# Patient Record
Sex: FEMALE | Race: ASIAN | NOT HISPANIC OR LATINO | URBAN - METROPOLITAN AREA
[De-identification: names, ages, dates, MRNs, and addresses within clinical notes are randomized per-mention and may not be internally consistent; named-entity substitution may affect disease eponyms.]

---

## 2023-07-27 ENCOUNTER — HOSPITAL ENCOUNTER (OUTPATIENT)
Dept: DATA CONVERSION | Facility: HOSPITAL | Age: 63
Discharge: HOME | End: 2023-07-27
Attending: EMERGENCY MEDICINE

## 2023-07-27 DIAGNOSIS — E86.0 DEHYDRATION: ICD-10-CM

## 2023-07-27 DIAGNOSIS — R06.02 SHORTNESS OF BREATH: ICD-10-CM

## 2023-07-27 DIAGNOSIS — Z20.822 CONTACT WITH AND (SUSPECTED) EXPOSURE TO COVID-19: ICD-10-CM

## 2023-07-27 DIAGNOSIS — Z98.890 OTHER SPECIFIED POSTPROCEDURAL STATES: ICD-10-CM

## 2023-07-27 DIAGNOSIS — Z87.828 PERSONAL HISTORY OF OTHER (HEALED) PHYSICAL INJURY AND TRAUMA: ICD-10-CM

## 2023-07-27 DIAGNOSIS — R42 DIZZINESS AND GIDDINESS: ICD-10-CM

## 2023-07-27 DIAGNOSIS — R53.1 WEAKNESS: Primary | ICD-10-CM

## 2023-07-27 LAB
ALBUMIN SERPL-MCNC: 4.3 GM/DL (ref 3.5–5)
ALBUMIN/GLOB SERPL: 1.4 RATIO (ref 1.5–3)
ALP BLD-CCNC: 113 U/L (ref 35–125)
ALT SERPL-CCNC: 35 U/L (ref 5–40)
ANION GAP SERPL CALCULATED.3IONS-SCNC: 12 MMOL/L (ref 0–19)
AST SERPL-CCNC: 18 U/L (ref 5–40)
BACTERIA UR QL AUTO: NEGATIVE
BASOPHILS # BLD AUTO: 0.05 K/UL (ref 0–0.22)
BASOPHILS NFR BLD AUTO: 0.9 % (ref 0–1)
BILIRUB SERPL-MCNC: 0.3 MG/DL (ref 0.1–1.2)
BILIRUB UR QL STRIP.AUTO: NEGATIVE
BUN SERPL-MCNC: 11 MG/DL (ref 8–25)
BUN/CREAT SERPL: 18.3 RATIO (ref 8–21)
CALCIUM SERPL-MCNC: 9.1 MG/DL (ref 8.5–10.4)
CHLORIDE SERPL-SCNC: 100 MMOL/L (ref 97–107)
CLARITY UR: CLEAR
CO2 SERPL-SCNC: 25 MMOL/L (ref 24–31)
COLOR UR: COLORLESS
CREAT SERPL-MCNC: 0.6 MG/DL (ref 0.4–1.6)
D DIMER PPP FEU-MCNC: 0.5 MG/L FEU (ref 0.19–0.5)
DEPRECATED RDW RBC AUTO: 43.4 FL (ref 37–54)
DIFFERENTIAL METHOD BLD: NORMAL
EOSINOPHIL # BLD AUTO: 0.12 K/UL (ref 0–0.45)
EOSINOPHIL NFR BLD: 2.1 % (ref 0–3)
ERYTHROCYTE [DISTWIDTH] IN BLOOD BY AUTOMATED COUNT: 13.6 % (ref 11.7–15)
EUA DISCLAIMER: NORMAL
FLUAV RNA NPH QL NAA+PROBE: NEGATIVE
FLUBV RNA NPH QL NAA+PROBE: NEGATIVE
GFR SERPL CREATININE-BSD FRML MDRD: 100 ML/MIN/1.73 M2
GLOBULIN SER-MCNC: 3 G/DL (ref 1.9–3.7)
GLUCOSE SERPL-MCNC: 142 MG/DL (ref 65–99)
GLUCOSE UR STRIP.AUTO-MCNC: NEGATIVE MG/DL
HCT VFR BLD AUTO: 42.5 % (ref 36–44)
HGB BLD-MCNC: 13.8 GM/DL (ref 12–15)
HGB UR QL STRIP.AUTO: 3 /HPF (ref 0–3)
HGB UR QL: NEGATIVE
HS TROPONIN T DELTA: NORMAL (ref 0–4)
HYALINE CASTS UR QL AUTO: NORMAL /LPF
IMM GRANULOCYTES # BLD AUTO: 0.02 K/UL (ref 0–0.1)
KETONES UR QL STRIP.AUTO: NEGATIVE
LEUKOCYTE ESTERASE UR QL STRIP.AUTO: NEGATIVE
LYMPHOCYTES # BLD AUTO: 1.97 K/UL (ref 1.2–3.2)
LYMPHOCYTES NFR BLD MANUAL: 34.6 % (ref 20–40)
MCH RBC QN AUTO: 28.2 PG (ref 26–34)
MCHC RBC AUTO-ENTMCNC: 32.5 % (ref 31–37)
MCV RBC AUTO: 86.9 FL (ref 80–100)
MICROSCOPIC (UA): NORMAL
MONOCYTES # BLD AUTO: 0.37 K/UL (ref 0–0.8)
MONOCYTES NFR BLD MANUAL: 6.5 % (ref 0–8)
NEUTROPHILS # BLD AUTO: 3.17 K/UL
NEUTROPHILS # BLD AUTO: 3.17 K/UL (ref 1.8–7.7)
NEUTROPHILS.IMMATURE NFR BLD: 0.4 % (ref 0–1)
NEUTS SEG NFR BLD: 55.5 % (ref 50–70)
NITRITE UR QL STRIP.AUTO: NEGATIVE
NRBC BLD-RTO: 0 /100 WBC
NT-PROBNP SERPL-MCNC: 78 PG/ML (ref 0–226)
PH UR STRIP.AUTO: 7.5 [PH] (ref 4.6–8)
PLATELET # BLD AUTO: 343 K/UL (ref 150–450)
PMV BLD AUTO: 10.4 CU (ref 7–12.6)
POTASSIUM SERPL-SCNC: 3.6 MMOL/L (ref 3.4–5.1)
PROT SERPL-MCNC: 7.3 G/DL (ref 5.9–7.9)
PROT UR STRIP.AUTO-MCNC: NEGATIVE MG/DL
RBC # BLD AUTO: 4.89 M/UL (ref 4–4.9)
SARS-COV-2 RNA SPEC QL NAA+PROBE: NEGATIVE
SODIUM SERPL-SCNC: 137 MMOL/L (ref 133–145)
SP GR UR STRIP.AUTO: 1.01 (ref 1–1.03)
SQUAMOUS UR QL AUTO: NORMAL /HPF
TROPONIN T SERPL-MCNC: 6 NG/L
URINE CULTURE: NORMAL
UROBILINOGEN UR QL STRIP.AUTO: NORMAL MG/DL (ref 0–1)
WBC # BLD AUTO: 5.7 K/UL (ref 4.5–11)
WBC #/AREA URNS AUTO: NORMAL /HPF (ref 0–3)

## 2023-09-12 ENCOUNTER — HOSPITAL ENCOUNTER (OUTPATIENT)
Dept: DATA CONVERSION | Facility: HOSPITAL | Age: 63
End: 2023-09-12

## 2023-09-12 DIAGNOSIS — M54.16 RADICULOPATHY, LUMBAR REGION: ICD-10-CM

## 2023-09-27 DIAGNOSIS — M79.641 RIGHT HAND PAIN: ICD-10-CM

## 2023-09-27 DIAGNOSIS — M25.641 STIFFNESS OF RIGHT HAND JOINT: Primary | ICD-10-CM

## 2023-10-02 ENCOUNTER — TREATMENT (OUTPATIENT)
Dept: OCCUPATIONAL THERAPY | Facility: CLINIC | Age: 63
End: 2023-10-02
Payer: MEDICARE

## 2023-10-02 DIAGNOSIS — M25.641 STIFFNESS OF RIGHT HAND JOINT: ICD-10-CM

## 2023-10-02 DIAGNOSIS — M79.641 RIGHT HAND PAIN: ICD-10-CM

## 2023-10-02 PROBLEM — Z98.890: Status: ACTIVE | Noted: 2023-10-02

## 2023-10-02 PROCEDURE — 97530 THERAPEUTIC ACTIVITIES: CPT | Mod: GO | Performed by: OCCUPATIONAL THERAPIST

## 2023-10-02 PROCEDURE — 97110 THERAPEUTIC EXERCISES: CPT | Mod: GO | Performed by: OCCUPATIONAL THERAPIST

## 2023-10-02 PROCEDURE — 97140 MANUAL THERAPY 1/> REGIONS: CPT | Mod: GO | Performed by: OCCUPATIONAL THERAPIST

## 2023-10-02 NOTE — PROGRESS NOTES
Occupational Therapy    Occupational Therapy Treatment    Name: Emmy Olson  MRN: [id number]  : 1960  Date: 24    Time Calculation  Start Time: 1715  Stop Time: 1805  Time Calculation (min): 50 min  OT Therapeutic Procedures Time Entry  Manual Therapy Time Entry: 15  Therapeutic Activity Time Entry: 20  Therapeutic Exercise Time Entry: 15    Assessment:   Pt tolerated therapeutic exercises and activities with good attention and no report of pain, minimal verbal cues for positioning, doing well. Recommending 2-3x daily exercises and CMC joint and wrist/thumb massage. Pt verbalized good understanding.   Pt would benefit from continued skilled OT services with focus on right thumb flexion for improving functional pinch, and decreasing pain and edema, increasing functional use with upgrading activities, providing HEP, ther ex, modalities, therapeutic activities to meet her OT goals.     Plan: Reassessment of UEFI and /pinch strength at next visit.       Subjective   General: Pt report stiffness of thumb,  present and supportive with assistance with language translation.       Pain Assessment: 3-4/10       Objective  Provided coban wrap applied for increasing MP and IP flexion, pt instructed with care and use at home, provided information for indep application.        Therapy/Activity: Therapeutic Exercise  Therapeutic Exercise Activity 1: AROM wrist hand fingers (focus on thumb with warm up and passive assist / stretch)  Therapeutic Activity: Pt completed theraputty and hand gripper with  tasks and simulation of opening containers, cutting with knife, and wringing out wash cloth.        Hand Assessment    Right Hand AROM:  R Thumb MCP 0-60 (degrees): 40 Degrees  R Thumb IP 0-80 (degrees): 10 Degrees  R Thumb Opposition to Index: 3/10MKI  R Index  MCP 0-90 (degrees): 70 Degrees (1 cm DPC)  R Index PIP 0-100 (degrees): 95 Degrees  R Index DIP 0-70 (degrees): 50 Degrees      Right Hand  PROM:  Provided passive ROM and assist with HEP       Goals:  Active       OT Problem       PATIENT WILL DEMONSTRATE INDEPENDENCE IN HOME PROGRAM FOR SUPPORT OF PROGRESSION       Start:  10/02/23    Expected End:  10/13/23            PATIENT WILL REPORT PAIN OF 1/10 DEMONSTRATING A REDUCTION OF OVERALL PAIN       Start:  10/02/23    Expected End:  10/13/23

## 2023-10-06 ENCOUNTER — TREATMENT (OUTPATIENT)
Dept: OCCUPATIONAL THERAPY | Facility: CLINIC | Age: 63
End: 2023-10-06
Payer: MEDICARE

## 2023-10-06 DIAGNOSIS — M25.641 STIFFNESS OF RIGHT HAND JOINT: ICD-10-CM

## 2023-10-06 DIAGNOSIS — M79.641 RIGHT HAND PAIN: ICD-10-CM

## 2023-10-06 PROCEDURE — 97110 THERAPEUTIC EXERCISES: CPT | Mod: GO

## 2023-10-06 PROCEDURE — 97035 APP MDLTY 1+ULTRASOUND EA 15: CPT | Mod: GO

## 2023-10-06 PROCEDURE — 97022 WHIRLPOOL THERAPY: CPT | Mod: GO,CO

## 2023-10-06 NOTE — PROGRESS NOTES
"Occupational Therapy    Occupational Therapy Treatment    Name: Emmy Olson  MRN: [id number]  : 1960  Date: 10/06/23  Time Calculation  Start Time: 1015 (visit 4 of 4)  Stop Time: 1108  Time Calculation (min): 53 min       Subjective Pt present with  during entire session to translate and relay information.   Min edema throughout R hand.   General:  OT Last Visit  OT Received On: 10/06/23 (visit 4 of 4)     Pain Assessment:6/10 constant, taking ibufrofen 3-4 times a day.        Objective    stated , \" it's just sore and stiff\".     Modalities:  Modalities Used: Yes  Modality 1: Untimed Other: (comment) (Fluido therapy x 10 min to R hand and thumb)    Ultra sound performed x 5 min at 3MHZ, .8 power to base of R thumb. Good tolerance     Therapy/Activity:   Therapeutic Exercise  Therapeutic Exercise Activity 1: AROM wrist hand fingers (focus on thumb with warm up and passive assist / stretch)    Passive thumb flexion with use of coban and sharpie marker to promote fleixon of all joints x 5 min. Educated pt and  on technique, provided with supplies and educated to complete 3 times daily, 5 min each. Fair tolerance by pt.   Sensory:Hyper sensitive. Educated pt and  on sensory re-integration tech. Able to return demo.        OP EDUCATION:  Education  Individual(s) Educated: Patient, Spouse  Education Provided: Other (edema control, scar massage and coban wrapping)  Home Program: PROM, Wound/scar care  Patient/Caregiver Demonstrated Understanding: yes  Patient Response to Education: Patient/Caregiver Verbalized Understanding of Information, Patient/Caregiver Performed Return Demonstration of Exercises/Activities    Pain at end of session at 8/10. Did not want ice at this time. Educated in importance of use of ice and heat to decrease inflammation and increase movement.   PLAN: Pt to follow through with coban wrapping, PROM, edema control, scar massage. To explore use of finger flexion " glove at next visit.

## 2024-03-21 ENCOUNTER — HOSPITAL ENCOUNTER (OUTPATIENT)
Dept: RADIOLOGY | Facility: CLINIC | Age: 64
Discharge: HOME | End: 2024-03-21
Payer: MEDICARE

## 2024-03-21 DIAGNOSIS — M50.122 CERVICAL DISC DISORDER AT C5-C6 LEVEL WITH RADICULOPATHY: ICD-10-CM

## 2024-03-21 PROCEDURE — 72141 MRI NECK SPINE W/O DYE: CPT

## 2024-03-21 PROCEDURE — 72141 MRI NECK SPINE W/O DYE: CPT | Performed by: RADIOLOGY

## 2024-04-05 ENCOUNTER — EVALUATION (OUTPATIENT)
Dept: OCCUPATIONAL THERAPY | Facility: CLINIC | Age: 64
End: 2024-04-05
Payer: MEDICARE

## 2024-04-05 DIAGNOSIS — M17.12 UNILATERAL PRIMARY OSTEOARTHRITIS, LEFT KNEE: ICD-10-CM

## 2024-04-05 DIAGNOSIS — M18.30: Primary | ICD-10-CM

## 2024-04-05 PROBLEM — M19.049 CMC ARTHRITIS: Status: ACTIVE | Noted: 2024-04-05

## 2024-04-05 PROBLEM — M19.049 CMC ARTHRITIS: Status: RESOLVED | Noted: 2024-04-05 | Resolved: 2024-04-05

## 2024-04-05 PROCEDURE — 97110 THERAPEUTIC EXERCISES: CPT | Mod: GO | Performed by: OCCUPATIONAL THERAPIST

## 2024-04-05 PROCEDURE — 97165 OT EVAL LOW COMPLEX 30 MIN: CPT | Mod: GO | Performed by: OCCUPATIONAL THERAPIST

## 2024-04-05 ASSESSMENT — PAIN DESCRIPTION - DESCRIPTORS: DESCRIPTORS: NUMBNESS;PINS AND NEEDLES

## 2024-04-05 ASSESSMENT — PAIN - FUNCTIONAL ASSESSMENT: PAIN_FUNCTIONAL_ASSESSMENT: 0-10

## 2024-04-05 ASSESSMENT — PAIN SCALES - GENERAL: PAINLEVEL_OUTOF10: 8
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
## 2024-04-08 ENCOUNTER — APPOINTMENT (OUTPATIENT)
Dept: OCCUPATIONAL THERAPY | Facility: CLINIC | Age: 64
End: 2024-04-08
Payer: MEDICARE

## 2024-04-16 ENCOUNTER — TREATMENT (OUTPATIENT)
Dept: OCCUPATIONAL THERAPY | Facility: CLINIC | Age: 64
End: 2024-04-16
Payer: MEDICARE

## 2024-04-16 DIAGNOSIS — Z98.890 STATUS POST REPAIR OF NERVE: ICD-10-CM

## 2024-04-16 DIAGNOSIS — M18.30: Primary | ICD-10-CM

## 2024-04-16 DIAGNOSIS — M17.12 UNILATERAL PRIMARY OSTEOARTHRITIS, LEFT KNEE: ICD-10-CM

## 2024-04-16 PROCEDURE — 97530 THERAPEUTIC ACTIVITIES: CPT | Mod: GO | Performed by: OCCUPATIONAL THERAPIST

## 2024-04-16 PROCEDURE — 97140 MANUAL THERAPY 1/> REGIONS: CPT | Mod: GO | Performed by: OCCUPATIONAL THERAPIST

## 2024-04-16 PROCEDURE — 97110 THERAPEUTIC EXERCISES: CPT | Mod: GO | Performed by: OCCUPATIONAL THERAPIST

## 2024-04-16 ASSESSMENT — PAIN DESCRIPTION - DESCRIPTORS: DESCRIPTORS: ACHING

## 2024-04-16 ASSESSMENT — PAIN SCALES - GENERAL: PAINLEVEL_OUTOF10: 7

## 2024-04-16 ASSESSMENT — PAIN - FUNCTIONAL ASSESSMENT: PAIN_FUNCTIONAL_ASSESSMENT: 0-10
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
## 2024-04-23 ENCOUNTER — TREATMENT (OUTPATIENT)
Dept: OCCUPATIONAL THERAPY | Facility: CLINIC | Age: 64
End: 2024-04-23
Payer: MEDICARE

## 2024-04-23 DIAGNOSIS — M18.30: Primary | ICD-10-CM

## 2024-04-23 DIAGNOSIS — M17.12 UNILATERAL PRIMARY OSTEOARTHRITIS, LEFT KNEE: ICD-10-CM

## 2024-04-23 DIAGNOSIS — Z98.890 STATUS POST REPAIR OF NERVE: ICD-10-CM

## 2024-04-23 PROCEDURE — 97530 THERAPEUTIC ACTIVITIES: CPT | Mod: GO | Performed by: OCCUPATIONAL THERAPIST

## 2024-04-23 PROCEDURE — 97140 MANUAL THERAPY 1/> REGIONS: CPT | Mod: GO | Performed by: OCCUPATIONAL THERAPIST

## 2024-04-23 PROCEDURE — 97110 THERAPEUTIC EXERCISES: CPT | Mod: GO | Performed by: OCCUPATIONAL THERAPIST

## 2024-04-23 ASSESSMENT — PAIN SCALES - GENERAL: PAINLEVEL_OUTOF10: 5 - MODERATE PAIN

## 2024-04-23 ASSESSMENT — PAIN DESCRIPTION - DESCRIPTORS: DESCRIPTORS: ACHING

## 2024-04-23 ASSESSMENT — PAIN - FUNCTIONAL ASSESSMENT: PAIN_FUNCTIONAL_ASSESSMENT: 0-10
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
## 2024-04-30 ENCOUNTER — TREATMENT (OUTPATIENT)
Dept: OCCUPATIONAL THERAPY | Facility: CLINIC | Age: 64
End: 2024-04-30
Payer: MEDICARE

## 2024-04-30 DIAGNOSIS — M17.12 UNILATERAL PRIMARY OSTEOARTHRITIS, LEFT KNEE: ICD-10-CM

## 2024-04-30 DIAGNOSIS — M18.30: Primary | ICD-10-CM

## 2024-04-30 DIAGNOSIS — Z98.890 STATUS POST REPAIR OF NERVE: ICD-10-CM

## 2024-04-30 PROCEDURE — 97110 THERAPEUTIC EXERCISES: CPT | Mod: GO | Performed by: OCCUPATIONAL THERAPIST

## 2024-04-30 PROCEDURE — 97140 MANUAL THERAPY 1/> REGIONS: CPT | Mod: GO | Performed by: OCCUPATIONAL THERAPIST

## 2024-04-30 PROCEDURE — 97530 THERAPEUTIC ACTIVITIES: CPT | Mod: GO | Performed by: OCCUPATIONAL THERAPIST

## 2024-04-30 ASSESSMENT — PAIN SCALES - GENERAL: PAINLEVEL_OUTOF10: 4

## 2024-04-30 ASSESSMENT — PAIN - FUNCTIONAL ASSESSMENT: PAIN_FUNCTIONAL_ASSESSMENT: 0-10

## 2024-04-30 ASSESSMENT — PAIN DESCRIPTION - DESCRIPTORS: DESCRIPTORS: ACHING
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
## 2024-05-07 ENCOUNTER — OFFICE VISIT (OUTPATIENT)
Dept: PRIMARY CARE | Facility: CLINIC | Age: 64
End: 2024-05-07
Payer: MEDICARE

## 2024-05-07 VITALS
DIASTOLIC BLOOD PRESSURE: 80 MMHG | WEIGHT: 166 LBS | BODY MASS INDEX: 31.37 KG/M2 | HEART RATE: 83 BPM | OXYGEN SATURATION: 97 % | SYSTOLIC BLOOD PRESSURE: 110 MMHG | TEMPERATURE: 97.1 F

## 2024-05-07 DIAGNOSIS — Z00.00 PHYSICAL EXAM: Primary | ICD-10-CM

## 2024-05-07 DIAGNOSIS — E78.5 HYPERLIPIDEMIA, UNSPECIFIED HYPERLIPIDEMIA TYPE: ICD-10-CM

## 2024-05-07 DIAGNOSIS — Z12.31 BREAST CANCER SCREENING BY MAMMOGRAM: ICD-10-CM

## 2024-05-07 DIAGNOSIS — R10.84 GENERALIZED ABDOMINAL PAIN: ICD-10-CM

## 2024-05-07 DIAGNOSIS — E55.9 VITAMIN D DEFICIENCY: ICD-10-CM

## 2024-05-07 DIAGNOSIS — Z12.11 COLON CANCER SCREENING: ICD-10-CM

## 2024-05-07 DIAGNOSIS — N95.9 MENOPAUSAL AND POSTMENOPAUSAL DISORDER: ICD-10-CM

## 2024-05-07 DIAGNOSIS — Z76.0 MEDICATION REFILL: ICD-10-CM

## 2024-05-07 DIAGNOSIS — R07.89 CHEST DISCOMFORT: ICD-10-CM

## 2024-05-07 PROBLEM — M54.16 RADICULOPATHY OF LUMBAR REGION: Status: ACTIVE | Noted: 2023-08-29

## 2024-05-07 PROBLEM — R10.9 ABDOMINAL PAIN: Status: ACTIVE | Noted: 2023-06-19

## 2024-05-07 PROBLEM — N32.81 OAB (OVERACTIVE BLADDER): Status: ACTIVE | Noted: 2024-05-07

## 2024-05-07 PROBLEM — K59.00 CONSTIPATION: Status: ACTIVE | Noted: 2024-05-07

## 2024-05-07 PROBLEM — H25.13 AGE-RELATED NUCLEAR CATARACT OF BOTH EYES: Status: ACTIVE | Noted: 2024-05-07

## 2024-05-07 PROBLEM — M50.122 CERVICAL DISC DISORDER AT C5-C6 LEVEL WITH RADICULOPATHY: Status: ACTIVE | Noted: 2022-09-15

## 2024-05-07 PROBLEM — K21.9 GASTROESOPHAGEAL REFLUX DISEASE: Status: ACTIVE | Noted: 2024-05-07

## 2024-05-07 PROBLEM — K29.50 CHRONIC GASTRITIS: Status: ACTIVE | Noted: 2024-05-07

## 2024-05-07 PROCEDURE — 1123F ACP DISCUSS/DSCN MKR DOCD: CPT | Performed by: INTERNAL MEDICINE

## 2024-05-07 PROCEDURE — 99397 PER PM REEVAL EST PAT 65+ YR: CPT | Performed by: INTERNAL MEDICINE

## 2024-05-07 PROCEDURE — 90677 PCV20 VACCINE IM: CPT | Performed by: INTERNAL MEDICINE

## 2024-05-07 PROCEDURE — 1158F ADVNC CARE PLAN TLK DOCD: CPT | Performed by: INTERNAL MEDICINE

## 2024-05-07 PROCEDURE — 1036F TOBACCO NON-USER: CPT | Performed by: INTERNAL MEDICINE

## 2024-05-07 PROCEDURE — 99215 OFFICE O/P EST HI 40 MIN: CPT | Performed by: INTERNAL MEDICINE

## 2024-05-07 PROCEDURE — G0438 PPPS, INITIAL VISIT: HCPCS | Performed by: INTERNAL MEDICINE

## 2024-05-07 PROCEDURE — 1159F MED LIST DOCD IN RCRD: CPT | Performed by: INTERNAL MEDICINE

## 2024-05-07 RX ORDER — OMEPRAZOLE 20 MG/1
40 CAPSULE, DELAYED RELEASE ORAL
COMMUNITY
End: 2024-05-07 | Stop reason: SDUPTHER

## 2024-05-07 RX ORDER — GABAPENTIN 100 MG/1
100 CAPSULE ORAL
Qty: 90 CAPSULE | Refills: 3 | Status: SHIPPED | OUTPATIENT
Start: 2024-05-07 | End: 2025-05-07

## 2024-05-07 RX ORDER — OMEPRAZOLE 20 MG/1
40 CAPSULE, DELAYED RELEASE ORAL
Qty: 90 CAPSULE | Refills: 1 | Status: SHIPPED | OUTPATIENT
Start: 2024-05-07 | End: 2024-05-13 | Stop reason: ALTCHOICE

## 2024-05-07 RX ORDER — DONEPEZIL HYDROCHLORIDE 5 MG/1
5 TABLET, FILM COATED ORAL
Qty: 90 TABLET | Refills: 3 | Status: SHIPPED | OUTPATIENT
Start: 2024-05-07 | End: 2025-05-07

## 2024-05-07 RX ORDER — GABAPENTIN 100 MG/1
100 CAPSULE ORAL
COMMUNITY
Start: 2024-03-19 | End: 2024-05-07 | Stop reason: SDUPTHER

## 2024-05-07 RX ORDER — DONEPEZIL HYDROCHLORIDE 5 MG/1
5 TABLET, FILM COATED ORAL
COMMUNITY
Start: 2022-06-15 | End: 2024-05-07 | Stop reason: SDUPTHER

## 2024-05-07 RX ORDER — RIVASTIGMINE TARTRATE 1.5 MG/1
1.5 CAPSULE ORAL 2 TIMES DAILY
COMMUNITY
Start: 2024-04-16

## 2024-05-07 ASSESSMENT — PATIENT HEALTH QUESTIONNAIRE - PHQ9
1. LITTLE INTEREST OR PLEASURE IN DOING THINGS: NOT AT ALL
2. FEELING DOWN, DEPRESSED OR HOPELESS: NOT AT ALL
SUM OF ALL RESPONSES TO PHQ9 QUESTIONS 1 AND 2: 0

## 2024-05-07 NOTE — PROGRESS NOTES
Outpatient Visit Note    Chief Complaint   Patient presents with    Annual Exam       HPI:  Emmy Olson is a 63 y.o. female here  for  a Medicare Wellness Visit.  Pt has chronic abdominal pain. She has chronic GERD. She c/o abdominal pain that radiates midsternally and into her back. She cannot eat spicy food without having abdominal pain. No nausea, vomiting, fever, chills. + decreased appetite.  Pt also is c/o left chest pain. She would like a referral to [facility] to evaluate her heart health.     Health Maintenance    Denies smoking or illicit drug use, drinks no alcoholic beverages a week. Patient reports routine vision checks and dental cleanings.     Screening colonoscopy overdue. Screening mammogram due. DEXA due.    Hearing screen: reports no difficulty with hearing     Does the patient use opioid medications:  No    How does the patient rate their health status today: fair    Cognitive Screen:  AAAx3  to person, place and time: Yes     Impression: diagnosed with mild dementia by geriatric psych    Reviewed:   Past Medical History/Allergies:  Yes  Family History:  Yes  Social History:  Yes  Current Medications:  Yes  Vital Signs:  Yes  Advanced Directives:  discussed  Immunizations:  reviewed today  Home Safety:                    Up & Go test > 30 seconds?  No                   Home have rugs; lack grab bars in bathroom; lack handrail on stairs; have poor lighting?  No                   Hearing difficulties?  No  Geriatric Assessment                   ADL areas requiring assistance:  Does not need help with Dressing, Eating, Ambulating, Toileting, Grooming, Hygiene.                    IADL areas requiring assistance:  Does not need help with Shopping, Housework, Accounting, Transportation, Driving.   Medications: reviewed  Current supplements:  Reviewed and recorded.   Other providers: Reviewed and recorded          Past Medical History:   Diagnosis Date    Personal history of other specified  conditions     History of heartburn        Current Medications  Current Outpatient Medications   Medication Instructions    donepezil (ARICEPT) 5 mg, oral, Daily RT    gabapentin (NEURONTIN) 100 mg, oral, Daily RT    omeprazole (PRILOSEC) 40 mg, oral, Daily before breakfast    rivastigmine (EXELON) 1.5 mg, oral, 2 times daily        Allergies  No Known Allergies     Immunizations  Immunization History   Administered Date(s) Administered    Pneumococcal conjugate vaccine, 20-valent (PREVNAR 20) 05/07/2024        History reviewed. No pertinent surgical history.  No family history on file.  Social History     Tobacco Use    Smoking status: Never    Smokeless tobacco: Never   Substance Use Topics    Alcohol use: Never    Drug use: Never     Tobacco Use: Low Risk  (5/7/2024)    Patient History     Smoking Tobacco Use: Never     Smokeless Tobacco Use: Never     Passive Exposure: Not on file        ROS  All pertinent positive symptoms are included in the history of present illness.  All other systems have been reviewed and are negative and noncontributory to this patient's current ailments.    VITAL SIGNS  Vitals:    05/07/24 1415   BP: 110/80   Pulse: 83   Temp: 36.2 °C (97.1 °F)   SpO2: 97%     Vitals:    05/07/24 1415   Weight: 75.3 kg (166 lb)      Body mass index is 31.37 kg/m².     PHYSICAL EXAM  GENERAL APPEARANCE: well nourished, well developed, looks like stated age, in no acute distress, not ill or tired appearing, conversing well.   HEENT: no trauma, normocephalic. PERRLA and EOMI with normal external exam. TM's intact with no injection or effusion, no signs of infection. Nares patent, turbinates pink without discharge. Pharynx pink with no exudates or lesions, no enlarged tonsils.   NECK: no nodes, supple without rigidity, no neck mass was observed, no thyromegaly or thyroid nodules.   HEART: regular rate and rhythm, S1 and S2 heard with no murmurs or skipped beats, no carotid bruits.   LUNGS: clear to  auscultation bilaterally with no wheezes, crackles or rales.   ABDOMEN: no organomegaly, soft, nontender, nondistended, normal bowel sounds, no guarding/rebound/rigidity.   EXTREMITIES: moving all extremities equally with no edema or deformities.   SKIN: normal skin color and pigmentation, normal skin turgor without rash, lesions, or nodules visualized.   NEUROLOGIC EXAM: CN II-XII grossly intact, normal gait, normal balance, 5/5 muscle strength, sensation grossly intact.   PSYCH: mood and affect appropriate; alert and oriented to time, place, person; no difficulty with speech or language.   LYMPH NODES: no cervical lymphadenopathy.         Assessment/Plan   Diagnoses and all orders for this visit:  Physical exam  Chest discomfort  -     Referral to Cardiology; Future  Generalized abdominal pain  -     Referral to Gastroenterology; Future  Colon cancer screening  -     Referral to Gastroenterology; Future  Breast cancer screening by mammogram  -     BI mammo bilateral screening tomosynthesis; Future  Menopausal and postmenopausal disorder  -     XR DEXA bone density; Future  Hyperlipidemia, unspecified hyperlipidemia type  -     Lipid Panel; Future  -     Comprehensive Metabolic Panel; Future  -     CBC; Future  -     TSH with reflex to Free T4 if abnormal; Future  Vitamin D deficiency  -     Vitamin D 25-Hydroxy,Total (for eval of Vitamin D levels); Future  Medication refill  -     donepezil (Aricept) 5 mg tablet; Take 1 tablet (5 mg) by mouth once daily.  -     gabapentin (Neurontin) 100 mg capsule; Take 1 capsule (100 mg) by mouth once daily.  -     omeprazole (PriLOSEC) 20 mg DR capsule; Take 2 capsules (40 mg) by mouth once daily in the morning. Take before meals.  Other orders  -     Pneumococcal conjugate vaccine, 20-valent (PREVNAR 20)      This was a shared decision making visit.    Next Wellness Exam Due  In 1 year from today      Edgard Mishra MD   05/07/24   10:40 PM

## 2024-05-09 ENCOUNTER — LAB (OUTPATIENT)
Dept: LAB | Facility: LAB | Age: 64
End: 2024-05-09
Payer: MEDICARE

## 2024-05-09 DIAGNOSIS — E55.9 VITAMIN D DEFICIENCY: ICD-10-CM

## 2024-05-09 DIAGNOSIS — E78.5 HYPERLIPIDEMIA, UNSPECIFIED HYPERLIPIDEMIA TYPE: ICD-10-CM

## 2024-05-09 LAB
25(OH)D3 SERPL-MCNC: 17 NG/ML (ref 31–100)
ALBUMIN SERPL-MCNC: 4.2 G/DL (ref 3.5–5)
ALP BLD-CCNC: 65 U/L (ref 35–125)
ALT SERPL-CCNC: 12 U/L (ref 5–40)
ANION GAP SERPL CALC-SCNC: 10 MMOL/L
AST SERPL-CCNC: 15 U/L (ref 5–40)
BILIRUB SERPL-MCNC: 0.3 MG/DL (ref 0.1–1.2)
BUN SERPL-MCNC: 12 MG/DL (ref 8–25)
CALCIUM SERPL-MCNC: 9.2 MG/DL (ref 8.5–10.4)
CHLORIDE SERPL-SCNC: 104 MMOL/L (ref 97–107)
CHOLEST SERPL-MCNC: 149 MG/DL (ref 133–200)
CHOLEST/HDLC SERPL: 2.8 {RATIO}
CO2 SERPL-SCNC: 28 MMOL/L (ref 24–31)
CREAT SERPL-MCNC: 0.8 MG/DL (ref 0.4–1.6)
EGFRCR SERPLBLD CKD-EPI 2021: 82 ML/MIN/1.73M*2
ERYTHROCYTE [DISTWIDTH] IN BLOOD BY AUTOMATED COUNT: 13.7 % (ref 11.5–14.5)
GLUCOSE SERPL-MCNC: 96 MG/DL (ref 65–99)
HCT VFR BLD AUTO: 43.6 % (ref 36–46)
HDLC SERPL-MCNC: 54 MG/DL
HGB BLD-MCNC: 13.4 G/DL (ref 12–16)
LDLC SERPL CALC-MCNC: 83 MG/DL (ref 65–130)
MCH RBC QN AUTO: 27.7 PG (ref 26–34)
MCHC RBC AUTO-ENTMCNC: 30.7 G/DL (ref 32–36)
MCV RBC AUTO: 90 FL (ref 80–100)
NRBC BLD-RTO: 0 /100 WBCS (ref 0–0)
PLATELET # BLD AUTO: 216 X10*3/UL (ref 150–450)
POTASSIUM SERPL-SCNC: 4.4 MMOL/L (ref 3.4–5.1)
PROT SERPL-MCNC: 6.5 G/DL (ref 5.9–7.9)
RBC # BLD AUTO: 4.84 X10*6/UL (ref 4–5.2)
SODIUM SERPL-SCNC: 142 MMOL/L (ref 133–145)
TRIGL SERPL-MCNC: 60 MG/DL (ref 40–150)
TSH SERPL DL<=0.05 MIU/L-ACNC: 4.2 MIU/L (ref 0.27–4.2)
WBC # BLD AUTO: 4.7 X10*3/UL (ref 4.4–11.3)

## 2024-05-09 PROCEDURE — 84443 ASSAY THYROID STIM HORMONE: CPT

## 2024-05-09 PROCEDURE — 80061 LIPID PANEL: CPT

## 2024-05-09 PROCEDURE — 82306 VITAMIN D 25 HYDROXY: CPT

## 2024-05-09 PROCEDURE — 85027 COMPLETE CBC AUTOMATED: CPT

## 2024-05-09 PROCEDURE — 80053 COMPREHEN METABOLIC PANEL: CPT

## 2024-05-09 PROCEDURE — 36415 COLL VENOUS BLD VENIPUNCTURE: CPT

## 2024-05-10 RX ORDER — ASPIRIN 325 MG
50000 TABLET, DELAYED RELEASE (ENTERIC COATED) ORAL
Qty: 12 CAPSULE | Refills: 0 | Status: SHIPPED | OUTPATIENT
Start: 2024-05-12

## 2024-05-13 DIAGNOSIS — K21.9 GASTROESOPHAGEAL REFLUX DISEASE, UNSPECIFIED WHETHER ESOPHAGITIS PRESENT: ICD-10-CM

## 2024-05-13 RX ORDER — PANTOPRAZOLE SODIUM 40 MG/1
40 TABLET, DELAYED RELEASE ORAL
COMMUNITY
Start: 2022-09-13 | End: 2024-05-13 | Stop reason: SDUPTHER

## 2024-05-13 RX ORDER — PANTOPRAZOLE SODIUM 40 MG/1
40 TABLET, DELAYED RELEASE ORAL
Qty: 90 TABLET | Refills: 3 | Status: SHIPPED | OUTPATIENT
Start: 2024-05-13

## 2024-05-13 NOTE — TELEPHONE ENCOUNTER
Pt  called on the 5/7/2024 OV they came in for appt and the wrong medication for his wife's GERD was sent to the pharmacy. He said she does not take the omeprazole but the pantoprazole 40mg. Can this be sent to the pharmacy to be given to them. Did explain to come to next appt with their meds so that it can be checked to out charts.

## 2024-05-14 ENCOUNTER — APPOINTMENT (OUTPATIENT)
Dept: RADIOLOGY | Facility: HOSPITAL | Age: 64
End: 2024-05-14
Payer: MEDICARE

## 2024-05-14 ENCOUNTER — TREATMENT (OUTPATIENT)
Dept: OCCUPATIONAL THERAPY | Facility: CLINIC | Age: 64
End: 2024-05-14
Payer: MEDICARE

## 2024-05-14 DIAGNOSIS — M18.30: Primary | ICD-10-CM

## 2024-05-14 DIAGNOSIS — Z98.890 STATUS POST REPAIR OF NERVE: ICD-10-CM

## 2024-05-14 DIAGNOSIS — M17.12 UNILATERAL PRIMARY OSTEOARTHRITIS, LEFT KNEE: ICD-10-CM

## 2024-05-14 PROCEDURE — 97110 THERAPEUTIC EXERCISES: CPT | Mod: GO | Performed by: OCCUPATIONAL THERAPIST

## 2024-05-14 PROCEDURE — 97530 THERAPEUTIC ACTIVITIES: CPT | Mod: GO | Performed by: OCCUPATIONAL THERAPIST

## 2024-05-14 PROCEDURE — 97140 MANUAL THERAPY 1/> REGIONS: CPT | Mod: GO | Performed by: OCCUPATIONAL THERAPIST

## 2024-05-14 ASSESSMENT — PAIN - FUNCTIONAL ASSESSMENT: PAIN_FUNCTIONAL_ASSESSMENT: 0-10

## 2024-05-14 ASSESSMENT — PAIN DESCRIPTION - DESCRIPTORS: DESCRIPTORS: ACHING;NUMBNESS

## 2024-05-14 ASSESSMENT — PAIN SCALES - GENERAL: PAINLEVEL_OUTOF10: 2
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
## 2024-05-20 ENCOUNTER — OFFICE VISIT (OUTPATIENT)
Dept: CARDIOLOGY | Facility: CLINIC | Age: 64
End: 2024-05-20
Payer: MEDICARE

## 2024-05-20 VITALS — WEIGHT: 167 LBS | BODY MASS INDEX: 31.55 KG/M2

## 2024-05-20 DIAGNOSIS — E78.2 MIXED HYPERLIPIDEMIA: ICD-10-CM

## 2024-05-20 DIAGNOSIS — R07.89 OTHER CHEST PAIN: Primary | ICD-10-CM

## 2024-05-20 DIAGNOSIS — R07.89 CHEST DISCOMFORT: ICD-10-CM

## 2024-05-20 PROCEDURE — 93010 ELECTROCARDIOGRAM REPORT: CPT | Performed by: INTERNAL MEDICINE

## 2024-05-20 PROCEDURE — 1159F MED LIST DOCD IN RCRD: CPT | Performed by: INTERNAL MEDICINE

## 2024-05-20 PROCEDURE — 1125F AMNT PAIN NOTED PAIN PRSNT: CPT | Performed by: INTERNAL MEDICINE

## 2024-05-20 PROCEDURE — 99204 OFFICE O/P NEW MOD 45 MIN: CPT | Performed by: INTERNAL MEDICINE

## 2024-05-20 PROCEDURE — 1036F TOBACCO NON-USER: CPT | Performed by: INTERNAL MEDICINE

## 2024-05-20 PROCEDURE — 99214 OFFICE O/P EST MOD 30 MIN: CPT | Performed by: INTERNAL MEDICINE

## 2024-05-20 PROCEDURE — 93005 ELECTROCARDIOGRAM TRACING: CPT | Performed by: INTERNAL MEDICINE

## 2024-05-20 ASSESSMENT — PATIENT HEALTH QUESTIONNAIRE - PHQ9
1. LITTLE INTEREST OR PLEASURE IN DOING THINGS: NOT AT ALL
SUM OF ALL RESPONSES TO PHQ9 QUESTIONS 1 AND 2: 0
2. FEELING DOWN, DEPRESSED OR HOPELESS: NOT AT ALL

## 2024-05-20 ASSESSMENT — ENCOUNTER SYMPTOMS
LOSS OF SENSATION IN FEET: 0
DEPRESSION: 0
OCCASIONAL FEELINGS OF UNSTEADINESS: 1

## 2024-05-20 ASSESSMENT — PAIN SCALES - GENERAL: PAINLEVEL: 5

## 2024-05-20 NOTE — PROGRESS NOTES
Referred by Dr. Mishra for Establish Care (Intermittent left-sided chest pain, referred by Dr. Mishra)     History Of Present Illness:    Emmy Olson is a 63 y.o. female presenting with chest pain.  This patient is Georgian speaking and her  provides history and questioning.  The patient has had an epigastric discomfort for a number of years now which has been attributed to gastroesophageal reflux disease.  Periodically though the discomfort will radiate from the epigastric area up into the neck.  This does not occur all the time and is much less frequent.  Occurs relatively sporadically and resolved on its own.  But given the symptoms she was referred to our practice for cardiac evaluation.  He had no prior history of cardiac disease.      Past Medical History:  She has a past medical history of Personal history of other specified conditions.  Gastroesophageal reflux disease    Past Surgical History:  Cholecystectomy      Social History:  She reports that she has never smoked. She has never used smokeless tobacco. She reports that she does not drink alcohol and does not use drugs.    Family History:  Father  at approximately age 90 with no history of heart disease.  Mother is alive at age 90 with no history of heart disease.     Allergies:  Patient has no known allergies.    Outpatient Medications:  Current Outpatient Medications   Medication Instructions   • cholecalciferol (VITAMIN D-3) 50,000 Units, oral, Once Weekly   • donepezil (ARICEPT) 5 mg, oral, Daily RT   • gabapentin (NEURONTIN) 100 mg, oral, Daily RT   • pantoprazole (PROTONIX) 40 mg, oral, Daily before breakfast   • rivastigmine (EXELON) 1.5 mg, oral, 2 times daily        Last Recorded Vitals:  Vitals:    24 1300   Weight: 75.8 kg (167 lb)       Physical Exam:  Constitutional:       Appearance: Not in distress.   Eyes:      Conjunctiva/sclera: Conjunctivae normal.   HENT:    Mouth/Throat:      Pharynx: Oropharynx is clear.   Neck:       Vascular: No carotid bruit. JVD normal.   Pulmonary:      Breath sounds: Normal breath sounds. No wheezing. No rales.   Cardiovascular:      Regular rhythm.      Murmurs: There is no murmur.      No gallop.  No click. No rub.   Abdominal:      Palpations: Abdomen is soft.      Tenderness: There is no abdominal tenderness.   Musculoskeletal:         General: No deformity. Neurological:      General: No focal deficit present.           Last Labs:  CBC -  Lab Results   Component Value Date    WBC 4.7 05/09/2024    HGB 13.4 05/09/2024    HCT 43.6 05/09/2024    MCV 90 05/09/2024     05/09/2024       CMP -  Lab Results   Component Value Date    CALCIUM 9.2 05/09/2024    PROT 6.5 05/09/2024    ALBUMIN 4.2 05/09/2024    AST 15 05/09/2024    ALT 12 05/09/2024    ALKPHOS 65 05/09/2024    BILITOT 0.3 05/09/2024       LIPID PANEL -   Lab Results   Component Value Date    CHOL 149 05/09/2024    TRIG 60 05/09/2024    HDL 54.0 05/09/2024    CHHDL 2.8 05/09/2024       RENAL FUNCTION PANEL -   Lab Results   Component Value Date    GLUCOSE 96 05/09/2024     05/09/2024    K 4.4 05/09/2024     05/09/2024    CO2 28 05/09/2024    ANIONGAP 10 05/09/2024    BUN 12 05/09/2024    CREATININE 0.80 05/09/2024    CALCIUM 9.2 05/09/2024    ALBUMIN 4.2 05/09/2024        Lab Results   Component Value Date    HGBA1C 5.9 11/26/2019       Last Cardiology Tests:  ECG:  ECG 12 lead (Clinic Performed) 05/20/2024  Normal sinus rhythm  Anterior ST T wave abnormality consider ischemia.  Possible remote septal infarct.    Echo:  No results found for this or any previous visit from the past 1095 days.      Ejection Fractions:    Cath:  No results found for this or any previous visit from the past 1095 days      Stress Test:  No results found for this or any previous visit from the past 1095 days.      Cardiac Imaging:  No results found for this or any previous visit from the past 1095 days.            Assessment/Plan        Hyperlipidemia  Reviewed Dr. Mishra' lipid panel:  Tchol 149 TG 60 HDL 54 LDL 83.  These cholesterol levels are really are reasonable if she has a low overall risk.  Certainly if her stress test is abnormal and we may need to be more aggressive.    Other chest pain  The chest pain etiology remains unclear.  She has very few cardiovascular risk factors but her EKG is abnormal.  I will thus order a myocardial perfusion stress test to look for evidence of ischemia.  Her  does not think she could effectively exercise on a treadmill thus a pharmacologic stress test will be ordered.  I will bring the patient back to the office to review the results with her.     Shiva Carpio, DO

## 2024-05-20 NOTE — ASSESSMENT & PLAN NOTE
The chest pain etiology remains unclear.  She has very few cardiovascular risk factors but her EKG is abnormal.  I will thus order a myocardial perfusion stress test to look for evidence of ischemia.  Her  does not think she could effectively exercise on a treadmill thus a pharmacologic stress test will be ordered.  I will bring the patient back to the office to review the results with her.

## 2024-05-20 NOTE — ASSESSMENT & PLAN NOTE
Reviewed Dr. Mishra' lipid panel:  Tchol 149 TG 60 HDL 54 LDL 83.  These cholesterol levels are really are reasonable if she has a low overall risk.  Certainly if her stress test is abnormal and we may need to be more aggressive.

## 2024-05-21 ENCOUNTER — TREATMENT (OUTPATIENT)
Dept: OCCUPATIONAL THERAPY | Facility: CLINIC | Age: 64
End: 2024-05-21
Payer: MEDICARE

## 2024-05-21 DIAGNOSIS — M18.30: Primary | ICD-10-CM

## 2024-05-21 DIAGNOSIS — M17.12 UNILATERAL PRIMARY OSTEOARTHRITIS, LEFT KNEE: ICD-10-CM

## 2024-05-21 PROCEDURE — 97140 MANUAL THERAPY 1/> REGIONS: CPT | Mod: GO | Performed by: OCCUPATIONAL THERAPIST

## 2024-05-21 PROCEDURE — 97110 THERAPEUTIC EXERCISES: CPT | Mod: GO | Performed by: OCCUPATIONAL THERAPIST

## 2024-05-21 PROCEDURE — 97530 THERAPEUTIC ACTIVITIES: CPT | Mod: GO | Performed by: OCCUPATIONAL THERAPIST

## 2024-05-21 ASSESSMENT — PAIN DESCRIPTION - DESCRIPTORS: DESCRIPTORS: ACHING

## 2024-05-21 ASSESSMENT — PAIN SCALES - GENERAL: PAINLEVEL_OUTOF10: 4

## 2024-05-21 ASSESSMENT — PAIN - FUNCTIONAL ASSESSMENT: PAIN_FUNCTIONAL_ASSESSMENT: 0-10
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
## 2024-05-22 ENCOUNTER — HOSPITAL ENCOUNTER (OUTPATIENT)
Dept: RADIOLOGY | Facility: HOSPITAL | Age: 64
Discharge: HOME | End: 2024-05-22
Payer: MEDICARE

## 2024-05-22 VITALS — WEIGHT: 167 LBS | BODY MASS INDEX: 31.53 KG/M2 | HEIGHT: 61 IN

## 2024-05-22 DIAGNOSIS — Z12.31 BREAST CANCER SCREENING BY MAMMOGRAM: ICD-10-CM

## 2024-05-22 PROCEDURE — 77067 SCR MAMMO BI INCL CAD: CPT

## 2024-05-22 PROCEDURE — 77063 BREAST TOMOSYNTHESIS BI: CPT | Performed by: STUDENT IN AN ORGANIZED HEALTH CARE EDUCATION/TRAINING PROGRAM

## 2024-05-22 PROCEDURE — 77067 SCR MAMMO BI INCL CAD: CPT | Performed by: STUDENT IN AN ORGANIZED HEALTH CARE EDUCATION/TRAINING PROGRAM

## 2024-05-23 ENCOUNTER — APPOINTMENT (OUTPATIENT)
Dept: OCCUPATIONAL THERAPY | Facility: CLINIC | Age: 64
End: 2024-05-23
Payer: MEDICARE

## 2024-05-24 ENCOUNTER — HOSPITAL ENCOUNTER (OUTPATIENT)
Dept: RADIOLOGY | Facility: HOSPITAL | Age: 64
Discharge: HOME | End: 2024-05-24
Payer: MEDICARE

## 2024-05-24 DIAGNOSIS — N95.9 MENOPAUSAL AND POSTMENOPAUSAL DISORDER: ICD-10-CM

## 2024-05-24 PROCEDURE — 77080 DXA BONE DENSITY AXIAL: CPT | Performed by: RADIOLOGY

## 2024-05-24 PROCEDURE — 77080 DXA BONE DENSITY AXIAL: CPT

## 2024-05-28 ENCOUNTER — TELEPHONE (OUTPATIENT)
Dept: PRIMARY CARE | Facility: CLINIC | Age: 64
End: 2024-05-28
Payer: MEDICARE

## 2024-05-30 NOTE — TELEPHONE ENCOUNTER
Lm to call office   Doxepin Pregnancy And Lactation Text: This medication is Pregnancy Category C and it isn't known if it is safe during pregnancy. It is also excreted in breast milk and breast feeding isn't recommended. 06-Jan-2017 14-Jan-2017

## 2024-06-05 ENCOUNTER — TELEPHONE (OUTPATIENT)
Dept: PHYSICAL THERAPY | Facility: CLINIC | Age: 64
End: 2024-06-05
Payer: MEDICARE

## 2024-06-06 ENCOUNTER — HOSPITAL ENCOUNTER (OUTPATIENT)
Dept: RADIOLOGY | Facility: HOSPITAL | Age: 64
Discharge: HOME | End: 2024-06-06
Payer: MEDICARE

## 2024-06-06 ENCOUNTER — HOSPITAL ENCOUNTER (OUTPATIENT)
Dept: CARDIOLOGY | Facility: HOSPITAL | Age: 64
Discharge: HOME | End: 2024-06-06
Payer: MEDICARE

## 2024-06-06 DIAGNOSIS — R07.89 OTHER CHEST PAIN: ICD-10-CM

## 2024-06-06 PROCEDURE — 2500000004 HC RX 250 GENERAL PHARMACY W/ HCPCS (ALT 636 FOR OP/ED): Performed by: INTERNAL MEDICINE

## 2024-06-06 PROCEDURE — A9502 TC99M TETROFOSMIN: HCPCS | Performed by: INTERNAL MEDICINE

## 2024-06-06 PROCEDURE — 93018 CV STRESS TEST I&R ONLY: CPT | Performed by: INTERNAL MEDICINE

## 2024-06-06 PROCEDURE — 3430000001 HC RX 343 DIAGNOSTIC RADIOPHARMACEUTICALS: Performed by: INTERNAL MEDICINE

## 2024-06-06 PROCEDURE — 93016 CV STRESS TEST SUPVJ ONLY: CPT | Performed by: INTERNAL MEDICINE

## 2024-06-06 PROCEDURE — 78452 HT MUSCLE IMAGE SPECT MULT: CPT

## 2024-06-06 PROCEDURE — 78452 HT MUSCLE IMAGE SPECT MULT: CPT | Performed by: RADIOLOGY

## 2024-06-06 PROCEDURE — 93017 CV STRESS TEST TRACING ONLY: CPT

## 2024-06-06 RX ORDER — REGADENOSON 0.08 MG/ML
0.4 INJECTION, SOLUTION INTRAVENOUS ONCE
Status: COMPLETED | OUTPATIENT
Start: 2024-06-06 | End: 2024-06-06

## 2024-06-06 RX ADMIN — TETROFOSMIN 34.5 MILLICURIE: 0.23 INJECTION, POWDER, LYOPHILIZED, FOR SOLUTION INTRAVENOUS at 10:54

## 2024-06-06 RX ADMIN — TETROFOSMIN 10.5 MILLICURIE: 0.23 INJECTION, POWDER, LYOPHILIZED, FOR SOLUTION INTRAVENOUS at 09:45

## 2024-06-06 RX ADMIN — REGADENOSON 0.4 MG: 0.08 INJECTION, SOLUTION INTRAVENOUS at 10:52

## 2024-06-10 ENCOUNTER — TREATMENT (OUTPATIENT)
Dept: OCCUPATIONAL THERAPY | Facility: CLINIC | Age: 64
End: 2024-06-10
Payer: MEDICARE

## 2024-06-10 DIAGNOSIS — M17.12 UNILATERAL PRIMARY OSTEOARTHRITIS, LEFT KNEE: ICD-10-CM

## 2024-06-10 PROCEDURE — 97140 MANUAL THERAPY 1/> REGIONS: CPT | Mod: GO | Performed by: OCCUPATIONAL THERAPIST

## 2024-06-10 PROCEDURE — 97530 THERAPEUTIC ACTIVITIES: CPT | Mod: GO | Performed by: OCCUPATIONAL THERAPIST

## 2024-06-10 PROCEDURE — 97110 THERAPEUTIC EXERCISES: CPT | Mod: GO | Performed by: OCCUPATIONAL THERAPIST

## 2024-06-10 ASSESSMENT — PAIN SCALES - GENERAL: PAINLEVEL_OUTOF10: 3

## 2024-06-10 ASSESSMENT — PAIN - FUNCTIONAL ASSESSMENT: PAIN_FUNCTIONAL_ASSESSMENT: 0-10

## 2024-06-10 ASSESSMENT — PAIN DESCRIPTION - DESCRIPTORS: DESCRIPTORS: NUMBNESS;SHARP
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
## 2024-06-16 NOTE — ASSESSMENT & PLAN NOTE
LDL chol 83 on last check with Dr. Mishra.  Given negative stress test and few risk factors, believe that would be reasonable number.

## 2024-06-17 ENCOUNTER — TREATMENT (OUTPATIENT)
Dept: OCCUPATIONAL THERAPY | Facility: CLINIC | Age: 64
End: 2024-06-17
Payer: MEDICARE

## 2024-06-17 DIAGNOSIS — M17.12 UNILATERAL PRIMARY OSTEOARTHRITIS, LEFT KNEE: ICD-10-CM

## 2024-06-17 PROCEDURE — 97140 MANUAL THERAPY 1/> REGIONS: CPT | Mod: GO | Performed by: OCCUPATIONAL THERAPIST

## 2024-06-17 PROCEDURE — 97110 THERAPEUTIC EXERCISES: CPT | Mod: GO | Performed by: OCCUPATIONAL THERAPIST

## 2024-06-17 PROCEDURE — 97530 THERAPEUTIC ACTIVITIES: CPT | Mod: GO | Performed by: OCCUPATIONAL THERAPIST

## 2024-06-17 ASSESSMENT — PAIN - FUNCTIONAL ASSESSMENT: PAIN_FUNCTIONAL_ASSESSMENT: 0-10

## 2024-06-17 ASSESSMENT — PAIN DESCRIPTION - DESCRIPTORS: DESCRIPTORS: SHARP

## 2024-06-17 ASSESSMENT — PAIN SCALES - GENERAL: PAINLEVEL_OUTOF10: 3
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
## 2024-06-24 ENCOUNTER — TREATMENT (OUTPATIENT)
Dept: OCCUPATIONAL THERAPY | Facility: CLINIC | Age: 64
End: 2024-06-24
Payer: MEDICARE

## 2024-06-24 DIAGNOSIS — M18.30: Primary | ICD-10-CM

## 2024-06-24 DIAGNOSIS — M17.12 UNILATERAL PRIMARY OSTEOARTHRITIS, LEFT KNEE: ICD-10-CM

## 2024-06-24 PROCEDURE — 97110 THERAPEUTIC EXERCISES: CPT | Mod: GO | Performed by: OCCUPATIONAL THERAPIST

## 2024-06-24 PROCEDURE — 97140 MANUAL THERAPY 1/> REGIONS: CPT | Mod: GO | Performed by: OCCUPATIONAL THERAPIST

## 2024-06-24 PROCEDURE — 97530 THERAPEUTIC ACTIVITIES: CPT | Mod: GO | Performed by: OCCUPATIONAL THERAPIST

## 2024-06-24 ASSESSMENT — PAIN DESCRIPTION - DESCRIPTORS: DESCRIPTORS: SHARP;ACHING

## 2024-06-24 ASSESSMENT — PAIN - FUNCTIONAL ASSESSMENT: PAIN_FUNCTIONAL_ASSESSMENT: 0-10

## 2024-06-24 ASSESSMENT — PAIN SCALES - GENERAL: PAINLEVEL_OUTOF10: 2
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
## 2024-06-25 ENCOUNTER — OFFICE VISIT (OUTPATIENT)
Dept: CARDIOLOGY | Facility: CLINIC | Age: 64
End: 2024-06-25
Payer: MEDICARE

## 2024-06-25 VITALS
WEIGHT: 168 LBS | DIASTOLIC BLOOD PRESSURE: 80 MMHG | SYSTOLIC BLOOD PRESSURE: 122 MMHG | BODY MASS INDEX: 31.74 KG/M2 | HEART RATE: 76 BPM

## 2024-06-25 DIAGNOSIS — E78.2 MIXED HYPERLIPIDEMIA: ICD-10-CM

## 2024-06-25 DIAGNOSIS — R07.89 OTHER CHEST PAIN: Primary | ICD-10-CM

## 2024-06-25 PROCEDURE — 99212 OFFICE O/P EST SF 10 MIN: CPT | Performed by: INTERNAL MEDICINE

## 2024-06-25 PROCEDURE — 1159F MED LIST DOCD IN RCRD: CPT | Performed by: INTERNAL MEDICINE

## 2024-06-25 PROCEDURE — 1126F AMNT PAIN NOTED NONE PRSNT: CPT | Performed by: INTERNAL MEDICINE

## 2024-06-25 PROCEDURE — 1036F TOBACCO NON-USER: CPT | Performed by: INTERNAL MEDICINE

## 2024-06-25 ASSESSMENT — PATIENT HEALTH QUESTIONNAIRE - PHQ9
SUM OF ALL RESPONSES TO PHQ9 QUESTIONS 1 AND 2: 0
2. FEELING DOWN, DEPRESSED OR HOPELESS: NOT AT ALL
1. LITTLE INTEREST OR PLEASURE IN DOING THINGS: NOT AT ALL

## 2024-06-25 ASSESSMENT — ENCOUNTER SYMPTOMS
NUMBNESS: 0
PALPITATIONS: 0
COUGH: 0
DYSURIA: 0
SHORTNESS OF BREATH: 0
LOSS OF SENSATION IN FEET: 0
DYSPNEA ON EXERTION: 0
BLURRED VISION: 0
OCCASIONAL FEELINGS OF UNSTEADINESS: 0
DEPRESSION: 0
HEMATURIA: 0
ABDOMINAL PAIN: 0
PARESTHESIAS: 0

## 2024-06-25 ASSESSMENT — PAIN SCALES - GENERAL: PAINLEVEL: 0-NO PAIN

## 2024-06-25 NOTE — PROGRESS NOTES
Mart Olson is a 63 y.o. female.    Chief Complaint:  Follow-up (Stress test results)    HPI  Patient is doing well overall.  No further chest pain or discomfort.  Staying physically active without significant restriction.    Review of Systems   Constitutional: Negative for malaise/fatigue.   HENT:  Negative for congestion.    Eyes:  Negative for blurred vision.   Cardiovascular:  Negative for chest pain, dyspnea on exertion and palpitations.   Respiratory:  Negative for cough and shortness of breath.    Musculoskeletal:  Negative for joint pain.   Gastrointestinal:  Negative for abdominal pain.   Genitourinary:  Negative for dysuria and hematuria.   Neurological:  Negative for numbness and paresthesias.       Objective   Constitutional:       Appearance: Not in distress.   Eyes:      Conjunctiva/sclera: Conjunctivae normal.   Neck:      Vascular: JVD normal.   Pulmonary:      Breath sounds: Normal breath sounds. No wheezing. No rhonchi. No rales.   Cardiovascular:      Normal rate. Regular rhythm.      Murmurs: There is no murmur.      No gallop.  No click. No rub.   Abdominal:      Palpations: Abdomen is soft.   Neurological:      General: No focal deficit present.      Mental Status: Alert.         Lab Review:       Assessment/Plan   The primary encounter diagnosis was Other chest pain. A diagnosis of Mixed hyperlipidemia was also pertinent to this visit.    Hyperlipidemia  LDL chol 83 on last check with Dr. Mishra.  Given negative stress test and few risk factors, believe that would be reasonable number.    Other chest pain  Reviewed negative stress test and provided some reassurance.  Chest discomfort the patient had been feeling likely noncardiac.  Will just recommend standard risk factor modification and follow on a clinical basis.  Will have the patient follow-up with myself on an as-needed basis.

## 2024-06-25 NOTE — ASSESSMENT & PLAN NOTE
Reviewed negative stress test and provided some reassurance.  Chest discomfort the patient had been feeling likely noncardiac.  Will just recommend standard risk factor modification and follow on a clinical basis.  Will have the patient follow-up with myself on an as-needed basis.

## 2024-07-02 ENCOUNTER — TREATMENT (OUTPATIENT)
Dept: OCCUPATIONAL THERAPY | Facility: CLINIC | Age: 64
End: 2024-07-02
Payer: MEDICARE

## 2024-07-02 DIAGNOSIS — M18.30: Primary | ICD-10-CM

## 2024-07-02 DIAGNOSIS — M17.12 UNILATERAL PRIMARY OSTEOARTHRITIS, LEFT KNEE: ICD-10-CM

## 2024-07-02 PROCEDURE — 97140 MANUAL THERAPY 1/> REGIONS: CPT | Mod: GO | Performed by: OCCUPATIONAL THERAPIST

## 2024-07-02 PROCEDURE — 97530 THERAPEUTIC ACTIVITIES: CPT | Mod: GO | Performed by: OCCUPATIONAL THERAPIST

## 2024-07-02 PROCEDURE — 97110 THERAPEUTIC EXERCISES: CPT | Mod: GO | Performed by: OCCUPATIONAL THERAPIST

## 2024-07-02 ASSESSMENT — PAIN SCALES - GENERAL: PAINLEVEL_OUTOF10: 3

## 2024-07-02 ASSESSMENT — PAIN - FUNCTIONAL ASSESSMENT: PAIN_FUNCTIONAL_ASSESSMENT: 0-10

## 2024-07-02 ASSESSMENT — PAIN DESCRIPTION - DESCRIPTORS: DESCRIPTORS: ACHING
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
## 2024-07-09 ENCOUNTER — TELEPHONE (OUTPATIENT)
Dept: PRIMARY CARE | Facility: CLINIC | Age: 64
End: 2024-07-09

## 2024-07-09 ENCOUNTER — OFFICE VISIT (OUTPATIENT)
Dept: PRIMARY CARE | Facility: CLINIC | Age: 64
End: 2024-07-09
Payer: MEDICARE

## 2024-07-09 VITALS
WEIGHT: 169 LBS | SYSTOLIC BLOOD PRESSURE: 130 MMHG | OXYGEN SATURATION: 98 % | DIASTOLIC BLOOD PRESSURE: 80 MMHG | HEART RATE: 77 BPM | BODY MASS INDEX: 31.93 KG/M2 | TEMPERATURE: 97.4 F

## 2024-07-09 DIAGNOSIS — M81.0 AGE RELATED OSTEOPOROSIS, UNSPECIFIED PATHOLOGICAL FRACTURE PRESENCE: Primary | ICD-10-CM

## 2024-07-09 DIAGNOSIS — L56.8 PHOTOSENSITIVITY OF SKIN: ICD-10-CM

## 2024-07-09 DIAGNOSIS — F03.A0 MILD DEMENTIA WITHOUT BEHAVIORAL DISTURBANCE, PSYCHOTIC DISTURBANCE, MOOD DISTURBANCE, OR ANXIETY, UNSPECIFIED DEMENTIA TYPE (MULTI): ICD-10-CM

## 2024-07-09 PROCEDURE — 1158F ADVNC CARE PLAN TLK DOCD: CPT | Performed by: INTERNAL MEDICINE

## 2024-07-09 PROCEDURE — 1159F MED LIST DOCD IN RCRD: CPT | Performed by: INTERNAL MEDICINE

## 2024-07-09 PROCEDURE — 1126F AMNT PAIN NOTED NONE PRSNT: CPT | Performed by: INTERNAL MEDICINE

## 2024-07-09 PROCEDURE — 99213 OFFICE O/P EST LOW 20 MIN: CPT | Performed by: INTERNAL MEDICINE

## 2024-07-09 PROCEDURE — 1123F ACP DISCUSS/DSCN MKR DOCD: CPT | Performed by: INTERNAL MEDICINE

## 2024-07-09 RX ORDER — PNV NO.95/FERROUS FUM/FOLIC AC 28MG-0.8MG
1 TABLET ORAL DAILY
Qty: 90 TABLET | Refills: 3 | Status: SHIPPED | OUTPATIENT
Start: 2024-07-09 | End: 2025-07-09

## 2024-07-09 RX ORDER — DENOSUMAB 60 MG/ML
60 INJECTION SUBCUTANEOUS
Qty: 1 ML | Refills: 1 | Status: SHIPPED | OUTPATIENT
Start: 2024-07-09

## 2024-07-09 ASSESSMENT — PAIN SCALES - GENERAL: PAINLEVEL: 0-NO PAIN

## 2024-07-09 NOTE — PROGRESS NOTES
Subjective   Patient ID: Emmy Olson is a 63 y.o. female who presents for BONE DENSITY RESULTS.    This is a 63 y.o. presenting for follow up of bone density results. Pt was noted to have osteoporosis. Because she has chronic abdominal symptoms, oral medications are not recommended.  Pt was also diagnosed with mild dementia by Dr. Purvis, but  states that she is not taking medications consistently because they are sedating.  She is also c/o rash and itching when her skin is exposed to hot water, or when the weather is hot outside.         Review of Systems   All other systems reviewed and are negative.      Objective   /80   Pulse 77   Temp 36.3 °C (97.4 °F)   Wt 76.7 kg (169 lb)   SpO2 98%   BMI 31.93 kg/m²     Physical Exam  Vitals reviewed.   Constitutional:       General: She is not in acute distress.     Appearance: Normal appearance. She is not ill-appearing.   Cardiovascular:      Rate and Rhythm: Normal rate and regular rhythm.      Heart sounds: Normal heart sounds.   Pulmonary:      Effort: Pulmonary effort is normal.      Breath sounds: Normal breath sounds.   Musculoskeletal:         General: No swelling or tenderness.      Right lower leg: No edema.      Left lower leg: No edema.   Skin:     General: Skin is warm and dry.   Neurological:      General: No focal deficit present.      Mental Status: She is alert and oriented to person, place, and time.   Psychiatric:         Mood and Affect: Mood normal.         Assessment/Plan   Diagnoses and all orders for this visit:  Age related osteoporosis, unspecified pathological fracture presence  -     calcium carbonate-vitamin D3 (Oscal-500) 500 mg-10 mcg (400 unit) tablet; Take 1 tablet by mouth once daily.  -     denosumab (Prolia) 60 mg/mL syringe; Inject 1 mL (60 mg total) under the skin every 6 months.  Mild dementia without behavioral disturbance, psychotic disturbance, mood disturbance, or anxiety, unspecified dementia type  (Multi)  Photosensitivity of skin  Comments:  Supportive care

## 2024-07-09 NOTE — TELEPHONE ENCOUNTER
Approved  Prior Authorization Portal   Prior authorization approved  Payer: [facility] and [employer] - Medicare Case ID: [id number]  Note from payer: [place] Case: [id number], Status: Approved, Coverage Starts on: 4/9/2024 12:00:00 AM, Coverage Ends on: 7/9/2025 12:00:00 AM.  Approval Details

## 2024-07-10 PROBLEM — F03.A0 MILD DEMENTIA WITHOUT BEHAVIORAL DISTURBANCE, PSYCHOTIC DISTURBANCE, MOOD DISTURBANCE, OR ANXIETY, UNSPECIFIED DEMENTIA TYPE (MULTI): Status: ACTIVE | Noted: 2024-07-10

## 2024-07-11 ENCOUNTER — TREATMENT (OUTPATIENT)
Dept: OCCUPATIONAL THERAPY | Facility: CLINIC | Age: 64
End: 2024-07-11
Payer: MEDICARE

## 2024-07-11 DIAGNOSIS — M18.30: Primary | ICD-10-CM

## 2024-07-11 DIAGNOSIS — M17.12 UNILATERAL PRIMARY OSTEOARTHRITIS, LEFT KNEE: ICD-10-CM

## 2024-07-11 PROCEDURE — 97530 THERAPEUTIC ACTIVITIES: CPT | Mod: GO | Performed by: OCCUPATIONAL THERAPIST

## 2024-07-11 PROCEDURE — 97140 MANUAL THERAPY 1/> REGIONS: CPT | Mod: GO | Performed by: OCCUPATIONAL THERAPIST

## 2024-07-11 PROCEDURE — 97110 THERAPEUTIC EXERCISES: CPT | Mod: GO | Performed by: OCCUPATIONAL THERAPIST
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
## 2024-07-12 ASSESSMENT — PAIN DESCRIPTION - DESCRIPTORS: DESCRIPTORS: DULL;DISCOMFORT

## 2024-07-12 ASSESSMENT — PAIN - FUNCTIONAL ASSESSMENT: PAIN_FUNCTIONAL_ASSESSMENT: 0-10

## 2024-07-12 ASSESSMENT — PAIN SCALES - GENERAL: PAINLEVEL_OUTOF10: 3

## 2024-07-18 ENCOUNTER — TELEPHONE (OUTPATIENT)
Dept: PRIMARY CARE | Facility: CLINIC | Age: 64
End: 2024-07-18
Payer: MEDICARE

## 2024-07-18 DIAGNOSIS — M81.0 AGE RELATED OSTEOPOROSIS, UNSPECIFIED PATHOLOGICAL FRACTURE PRESENCE: ICD-10-CM

## 2024-07-18 NOTE — TELEPHONE ENCOUNTER
Pt's  is calling regarding  injections, I was not clear on what he said about them, I understood him to say he needed to speak with the nurse regarding them, please call pt's

## 2024-07-19 ENCOUNTER — CLINICAL SUPPORT (OUTPATIENT)
Dept: PRIMARY CARE | Facility: CLINIC | Age: 64
End: 2024-07-19
Payer: MEDICARE

## 2024-07-19 PROCEDURE — 2500000004 HC RX 250 GENERAL PHARMACY W/ HCPCS (ALT 636 FOR OP/ED): Mod: JZ | Performed by: STUDENT IN AN ORGANIZED HEALTH CARE EDUCATION/TRAINING PROGRAM

## 2024-07-19 PROCEDURE — 96372 THER/PROPH/DIAG INJ SC/IM: CPT | Performed by: STUDENT IN AN ORGANIZED HEALTH CARE EDUCATION/TRAINING PROGRAM

## 2024-07-26 ENCOUNTER — APPOINTMENT (OUTPATIENT)
Dept: OCCUPATIONAL THERAPY | Facility: CLINIC | Age: 64
End: 2024-07-26
Payer: MEDICARE

## 2024-08-02 ENCOUNTER — TREATMENT (OUTPATIENT)
Dept: OCCUPATIONAL THERAPY | Facility: CLINIC | Age: 64
End: 2024-08-02
Payer: MEDICARE

## 2024-08-02 DIAGNOSIS — M18.30: ICD-10-CM

## 2024-08-02 DIAGNOSIS — M17.12 UNILATERAL PRIMARY OSTEOARTHRITIS, LEFT KNEE: Primary | ICD-10-CM

## 2024-08-02 PROCEDURE — 97110 THERAPEUTIC EXERCISES: CPT | Mod: GO | Performed by: OCCUPATIONAL THERAPIST

## 2024-08-02 PROCEDURE — 97140 MANUAL THERAPY 1/> REGIONS: CPT | Mod: GO | Performed by: OCCUPATIONAL THERAPIST
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
## 2024-08-05 ENCOUNTER — TREATMENT (OUTPATIENT)
Dept: OCCUPATIONAL THERAPY | Facility: CLINIC | Age: 64
End: 2024-08-05
Payer: MEDICARE

## 2024-08-05 DIAGNOSIS — M17.12 UNILATERAL PRIMARY OSTEOARTHRITIS, LEFT KNEE: ICD-10-CM

## 2024-08-05 PROCEDURE — 97110 THERAPEUTIC EXERCISES: CPT | Mod: GO | Performed by: OCCUPATIONAL THERAPIST

## 2024-08-05 PROCEDURE — 97140 MANUAL THERAPY 1/> REGIONS: CPT | Mod: GO | Performed by: OCCUPATIONAL THERAPIST

## 2024-08-05 PROCEDURE — 97530 THERAPEUTIC ACTIVITIES: CPT | Mod: GO | Performed by: OCCUPATIONAL THERAPIST

## 2024-08-05 ASSESSMENT — PAIN DESCRIPTION - DESCRIPTORS: DESCRIPTORS: DULL

## 2024-08-05 ASSESSMENT — PAIN - FUNCTIONAL ASSESSMENT: PAIN_FUNCTIONAL_ASSESSMENT: 0-10

## 2024-08-05 ASSESSMENT — PAIN SCALES - GENERAL: PAINLEVEL_OUTOF10: 3
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
## 2024-08-23 ENCOUNTER — APPOINTMENT (OUTPATIENT)
Dept: OCCUPATIONAL THERAPY | Facility: CLINIC | Age: 64
End: 2024-08-23
Payer: MEDICARE

## 2024-08-30 ENCOUNTER — APPOINTMENT (OUTPATIENT)
Dept: OCCUPATIONAL THERAPY | Facility: CLINIC | Age: 64
End: 2024-08-30
Payer: MEDICARE

## 2024-09-03 ENCOUNTER — TREATMENT (OUTPATIENT)
Dept: OCCUPATIONAL THERAPY | Facility: CLINIC | Age: 64
End: 2024-09-03
Payer: MEDICARE

## 2024-09-03 DIAGNOSIS — M17.12 UNILATERAL PRIMARY OSTEOARTHRITIS, LEFT KNEE: Primary | ICD-10-CM

## 2024-09-03 DIAGNOSIS — M18.30: ICD-10-CM

## 2024-09-03 PROCEDURE — 97140 MANUAL THERAPY 1/> REGIONS: CPT | Mod: GO | Performed by: OCCUPATIONAL THERAPIST

## 2024-09-03 PROCEDURE — 97110 THERAPEUTIC EXERCISES: CPT | Mod: GO | Performed by: OCCUPATIONAL THERAPIST

## 2024-09-03 PROCEDURE — 97530 THERAPEUTIC ACTIVITIES: CPT | Mod: GO | Performed by: OCCUPATIONAL THERAPIST

## 2024-09-03 ASSESSMENT — PAIN - FUNCTIONAL ASSESSMENT: PAIN_FUNCTIONAL_ASSESSMENT: 0-10

## 2024-09-03 ASSESSMENT — PAIN DESCRIPTION - DESCRIPTORS: DESCRIPTORS: DISCOMFORT

## 2024-09-03 ASSESSMENT — PAIN SCALES - GENERAL: PAINLEVEL_OUTOF10: 4
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
## 2024-09-09 ENCOUNTER — DOCUMENTATION (OUTPATIENT)
Dept: OCCUPATIONAL THERAPY | Facility: CLINIC | Age: 64
End: 2024-09-09
Payer: MEDICARE

## 2024-09-09 ENCOUNTER — TREATMENT (OUTPATIENT)
Dept: OCCUPATIONAL THERAPY | Facility: CLINIC | Age: 64
End: 2024-09-09
Payer: MEDICARE

## 2024-09-09 DIAGNOSIS — M17.12 UNILATERAL PRIMARY OSTEOARTHRITIS, LEFT KNEE: ICD-10-CM

## 2024-09-09 NOTE — PROGRESS NOTES
Occupational Therapy                 Therapy Communication Note    Patient Name: Emmy Olson  MRN: [id number]  Today's Date: 9/9/2024     Discipline: Occupational Therapy    Missed Visit Reason:      Missed Time: Cancel    Comment: Pt cancelled due to modality unavailable, pt has limited treatment visits and will reschedule for next week.

## 2024-09-16 ENCOUNTER — APPOINTMENT (OUTPATIENT)
Dept: OCCUPATIONAL THERAPY | Facility: CLINIC | Age: 64
End: 2024-09-16
Payer: MEDICARE

## 2024-09-19 ENCOUNTER — APPOINTMENT (OUTPATIENT)
Dept: OCCUPATIONAL THERAPY | Facility: CLINIC | Age: 64
End: 2024-09-19
Payer: MEDICARE

## 2024-09-19 DIAGNOSIS — M17.12 UNILATERAL PRIMARY OSTEOARTHRITIS, LEFT KNEE: Primary | ICD-10-CM

## 2024-09-19 PROCEDURE — 97530 THERAPEUTIC ACTIVITIES: CPT | Mod: GO | Performed by: OCCUPATIONAL THERAPIST

## 2024-09-19 PROCEDURE — 97140 MANUAL THERAPY 1/> REGIONS: CPT | Mod: GO | Performed by: OCCUPATIONAL THERAPIST

## 2024-09-19 PROCEDURE — 97110 THERAPEUTIC EXERCISES: CPT | Mod: GO | Performed by: OCCUPATIONAL THERAPIST

## 2024-09-19 ASSESSMENT — PAIN - FUNCTIONAL ASSESSMENT: PAIN_FUNCTIONAL_ASSESSMENT: 0-10

## 2024-09-19 ASSESSMENT — PAIN SCALES - GENERAL: PAINLEVEL_OUTOF10: 4
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
## 2024-10-17 ENCOUNTER — TREATMENT (OUTPATIENT)
Dept: OCCUPATIONAL THERAPY | Facility: CLINIC | Age: 64
End: 2024-10-17
Payer: MEDICARE

## 2024-10-17 DIAGNOSIS — M17.12 UNILATERAL PRIMARY OSTEOARTHRITIS, LEFT KNEE: ICD-10-CM

## 2024-10-17 PROCEDURE — 97110 THERAPEUTIC EXERCISES: CPT | Mod: GO | Performed by: OCCUPATIONAL THERAPIST

## 2024-10-17 PROCEDURE — 97530 THERAPEUTIC ACTIVITIES: CPT | Mod: GO | Performed by: OCCUPATIONAL THERAPIST

## 2024-10-17 PROCEDURE — 97140 MANUAL THERAPY 1/> REGIONS: CPT | Mod: GO | Performed by: OCCUPATIONAL THERAPIST

## 2024-10-17 ASSESSMENT — PAIN SCALES - GENERAL: PAINLEVEL_OUTOF10: 4

## 2024-10-17 ASSESSMENT — PAIN DESCRIPTION - DESCRIPTORS: DESCRIPTORS: TIGHTNESS

## 2024-10-17 ASSESSMENT — PAIN - FUNCTIONAL ASSESSMENT: PAIN_FUNCTIONAL_ASSESSMENT: 0-10

## 2024-10-24 ENCOUNTER — TREATMENT (OUTPATIENT)
Dept: OCCUPATIONAL THERAPY | Facility: CLINIC | Age: 64
End: 2024-10-24
Payer: MEDICARE

## 2024-10-24 DIAGNOSIS — M18.30: ICD-10-CM

## 2024-10-24 DIAGNOSIS — M17.12 UNILATERAL PRIMARY OSTEOARTHRITIS, LEFT KNEE: Primary | ICD-10-CM

## 2024-10-24 PROCEDURE — 97168 OT RE-EVAL EST PLAN CARE: CPT | Mod: GO | Performed by: OCCUPATIONAL THERAPIST

## 2024-10-24 PROCEDURE — 97110 THERAPEUTIC EXERCISES: CPT | Mod: GO | Performed by: OCCUPATIONAL THERAPIST

## 2024-10-24 PROCEDURE — 97140 MANUAL THERAPY 1/> REGIONS: CPT | Mod: GO | Performed by: OCCUPATIONAL THERAPIST

## 2024-10-24 ASSESSMENT — PAIN DESCRIPTION - DESCRIPTORS: DESCRIPTORS: TIGHTNESS;SORE

## 2024-10-24 ASSESSMENT — PAIN SCALES - GENERAL: PAINLEVEL_OUTOF10: 3

## 2024-10-24 ASSESSMENT — PAIN - FUNCTIONAL ASSESSMENT: PAIN_FUNCTIONAL_ASSESSMENT: 0-10
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
## 2024-12-31 ENCOUNTER — HOSPITAL ENCOUNTER (OUTPATIENT)
Dept: RADIOLOGY | Facility: HOSPITAL | Age: 64
Discharge: HOME | End: 2024-12-31
Payer: MEDICARE

## 2024-12-31 DIAGNOSIS — M48.062 SPINAL STENOSIS, LUMBAR REGION WITH NEUROGENIC CLAUDICATION: ICD-10-CM

## 2024-12-31 PROCEDURE — 72148 MRI LUMBAR SPINE W/O DYE: CPT

## 2024-12-31 PROCEDURE — 72148 MRI LUMBAR SPINE W/O DYE: CPT | Performed by: RADIOLOGY

## 2025-01-10 ENCOUNTER — EVALUATION (OUTPATIENT)
Dept: OCCUPATIONAL THERAPY | Facility: CLINIC | Age: 65
End: 2025-01-10
Payer: MEDICARE

## 2025-01-10 DIAGNOSIS — M25.641 STIFFNESS OF RIGHT HAND, NOT ELSEWHERE CLASSIFIED: Primary | ICD-10-CM

## 2025-01-10 PROCEDURE — 97166 OT EVAL MOD COMPLEX 45 MIN: CPT | Mod: GO | Performed by: OCCUPATIONAL THERAPIST

## 2025-01-10 ASSESSMENT — PAIN DESCRIPTION - DESCRIPTORS: DESCRIPTORS: SORE

## 2025-01-10 ASSESSMENT — PAIN - FUNCTIONAL ASSESSMENT: PAIN_FUNCTIONAL_ASSESSMENT: 0-10

## 2025-01-10 ASSESSMENT — PAIN SCALES - GENERAL: PAINLEVEL_OUTOF10: 2
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
## 2025-01-13 ENCOUNTER — TELEPHONE (OUTPATIENT)
Dept: OCCUPATIONAL THERAPY | Facility: CLINIC | Age: 65
End: 2025-01-13
Payer: MEDICARE

## 2025-01-13 ENCOUNTER — TELEPHONE (OUTPATIENT)
Dept: PRIMARY CARE | Facility: CLINIC | Age: 65
End: 2025-01-13
Payer: MEDICARE

## 2025-01-14 DIAGNOSIS — M81.0 AGE RELATED OSTEOPOROSIS, UNSPECIFIED PATHOLOGICAL FRACTURE PRESENCE: ICD-10-CM

## 2025-01-17 ENCOUNTER — TREATMENT (OUTPATIENT)
Dept: OCCUPATIONAL THERAPY | Facility: CLINIC | Age: 65
End: 2025-01-17
Payer: MEDICARE

## 2025-01-17 DIAGNOSIS — M25.641 STIFFNESS OF RIGHT HAND, NOT ELSEWHERE CLASSIFIED: Primary | ICD-10-CM

## 2025-01-17 PROCEDURE — 97110 THERAPEUTIC EXERCISES: CPT | Mod: GO | Performed by: OCCUPATIONAL THERAPIST

## 2025-01-17 PROCEDURE — 97530 THERAPEUTIC ACTIVITIES: CPT | Mod: GO | Performed by: OCCUPATIONAL THERAPIST

## 2025-01-17 PROCEDURE — 97140 MANUAL THERAPY 1/> REGIONS: CPT | Mod: GO | Performed by: OCCUPATIONAL THERAPIST

## 2025-01-17 ASSESSMENT — PAIN SCALES - GENERAL: PAINLEVEL_OUTOF10: 2

## 2025-01-17 ASSESSMENT — PAIN DESCRIPTION - DESCRIPTORS: DESCRIPTORS: TIGHTNESS

## 2025-01-17 ASSESSMENT — PAIN - FUNCTIONAL ASSESSMENT: PAIN_FUNCTIONAL_ASSESSMENT: 0-10
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
## 2025-01-21 ENCOUNTER — CLINICAL SUPPORT (OUTPATIENT)
Dept: PRIMARY CARE | Facility: CLINIC | Age: 65
End: 2025-01-21
Payer: MEDICARE

## 2025-01-21 DIAGNOSIS — M81.0 AGE RELATED OSTEOPOROSIS, UNSPECIFIED PATHOLOGICAL FRACTURE PRESENCE: ICD-10-CM

## 2025-01-21 RX ORDER — DENOSUMAB 60 MG/ML
60 INJECTION SUBCUTANEOUS
Qty: 1 ML | Refills: 1 | Status: SHIPPED | OUTPATIENT
Start: 2025-01-21

## 2025-01-21 NOTE — TELEPHONE ENCOUNTER
Pt came in today to receive the shot but an order needs made to the pharmacy for them to  and bring to me to have done, not office supplied medication. Please send order to the pharmacy so that they may  and have done.

## 2025-01-24 ENCOUNTER — TREATMENT (OUTPATIENT)
Dept: OCCUPATIONAL THERAPY | Facility: CLINIC | Age: 65
End: 2025-01-24
Payer: MEDICARE

## 2025-01-24 DIAGNOSIS — M25.641 STIFFNESS OF RIGHT HAND, NOT ELSEWHERE CLASSIFIED: ICD-10-CM

## 2025-01-24 PROCEDURE — 97110 THERAPEUTIC EXERCISES: CPT | Mod: GO,CO

## 2025-01-24 ASSESSMENT — PAIN DESCRIPTION - DESCRIPTORS: DESCRIPTORS: SORE

## 2025-01-24 ASSESSMENT — PAIN SCALES - GENERAL: PAINLEVEL_OUTOF10: 2

## 2025-01-24 ASSESSMENT — PAIN - FUNCTIONAL ASSESSMENT: PAIN_FUNCTIONAL_ASSESSMENT: 0-10
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
## 2025-01-29 ENCOUNTER — CLINICAL SUPPORT (OUTPATIENT)
Dept: PRIMARY CARE | Facility: CLINIC | Age: 65
End: 2025-01-29
Payer: MEDICARE

## 2025-01-29 DIAGNOSIS — M81.0 AGE RELATED OSTEOPOROSIS, UNSPECIFIED PATHOLOGICAL FRACTURE PRESENCE: ICD-10-CM

## 2025-01-29 PROCEDURE — 2500000004 HC RX 250 GENERAL PHARMACY W/ HCPCS (ALT 636 FOR OP/ED): Mod: JZ | Performed by: INTERNAL MEDICINE

## 2025-01-29 PROCEDURE — 96372 THER/PROPH/DIAG INJ SC/IM: CPT | Performed by: INTERNAL MEDICINE

## 2025-01-29 RX ADMIN — DENOSUMAB 60 MG: 60 INJECTION SUBCUTANEOUS at 15:02

## 2025-01-31 ENCOUNTER — TREATMENT (OUTPATIENT)
Dept: OCCUPATIONAL THERAPY | Facility: CLINIC | Age: 65
End: 2025-01-31
Payer: MEDICARE

## 2025-01-31 DIAGNOSIS — M25.641 STIFFNESS OF RIGHT HAND, NOT ELSEWHERE CLASSIFIED: ICD-10-CM

## 2025-01-31 PROCEDURE — 97530 THERAPEUTIC ACTIVITIES: CPT | Mod: GO | Performed by: OCCUPATIONAL THERAPIST

## 2025-01-31 PROCEDURE — 97140 MANUAL THERAPY 1/> REGIONS: CPT | Mod: GO | Performed by: OCCUPATIONAL THERAPIST

## 2025-01-31 PROCEDURE — 97110 THERAPEUTIC EXERCISES: CPT | Mod: GO | Performed by: OCCUPATIONAL THERAPIST

## 2025-01-31 ASSESSMENT — PAIN DESCRIPTION - DESCRIPTORS: DESCRIPTORS: TENDER;TIGHTNESS

## 2025-01-31 ASSESSMENT — PAIN - FUNCTIONAL ASSESSMENT: PAIN_FUNCTIONAL_ASSESSMENT: 0-10

## 2025-01-31 ASSESSMENT — PAIN SCALES - GENERAL: PAINLEVEL_OUTOF10: 2
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
## 2025-02-07 ENCOUNTER — APPOINTMENT (OUTPATIENT)
Dept: OCCUPATIONAL THERAPY | Facility: CLINIC | Age: 65
End: 2025-02-07
Payer: MEDICARE

## 2025-02-11 ENCOUNTER — TREATMENT (OUTPATIENT)
Dept: OCCUPATIONAL THERAPY | Facility: CLINIC | Age: 65
End: 2025-02-11
Payer: MEDICARE

## 2025-02-11 DIAGNOSIS — M25.641 STIFFNESS OF RIGHT HAND, NOT ELSEWHERE CLASSIFIED: ICD-10-CM

## 2025-02-11 PROCEDURE — 97110 THERAPEUTIC EXERCISES: CPT | Mod: GO,CO

## 2025-02-11 ASSESSMENT — PAIN - FUNCTIONAL ASSESSMENT: PAIN_FUNCTIONAL_ASSESSMENT: 0-10

## 2025-02-11 ASSESSMENT — PAIN DESCRIPTION - DESCRIPTORS: DESCRIPTORS: TIGHTNESS;OTHER (COMMENT)

## 2025-02-11 ASSESSMENT — PAIN SCALES - GENERAL: PAINLEVEL_OUTOF10: 2
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
## 2025-02-14 ENCOUNTER — TREATMENT (OUTPATIENT)
Dept: OCCUPATIONAL THERAPY | Facility: CLINIC | Age: 65
End: 2025-02-14
Payer: MEDICARE

## 2025-02-14 DIAGNOSIS — M25.641 STIFFNESS OF RIGHT HAND, NOT ELSEWHERE CLASSIFIED: ICD-10-CM

## 2025-02-14 PROCEDURE — 97110 THERAPEUTIC EXERCISES: CPT | Mod: GO,CO

## 2025-02-14 ASSESSMENT — PAIN DESCRIPTION - DESCRIPTORS: DESCRIPTORS: TIGHTNESS;SORE

## 2025-02-14 ASSESSMENT — PAIN SCALES - GENERAL: PAINLEVEL_OUTOF10: 4

## 2025-02-14 ASSESSMENT — PAIN - FUNCTIONAL ASSESSMENT: PAIN_FUNCTIONAL_ASSESSMENT: 0-10
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
## 2025-02-21 ENCOUNTER — TREATMENT (OUTPATIENT)
Dept: OCCUPATIONAL THERAPY | Facility: CLINIC | Age: 65
End: 2025-02-21
Payer: MEDICARE

## 2025-02-21 DIAGNOSIS — M25.641 STIFFNESS OF RIGHT HAND, NOT ELSEWHERE CLASSIFIED: ICD-10-CM

## 2025-02-21 PROCEDURE — 97530 THERAPEUTIC ACTIVITIES: CPT | Mod: GO | Performed by: OCCUPATIONAL THERAPIST

## 2025-02-21 PROCEDURE — 97140 MANUAL THERAPY 1/> REGIONS: CPT | Mod: GO | Performed by: OCCUPATIONAL THERAPIST

## 2025-02-21 PROCEDURE — 97110 THERAPEUTIC EXERCISES: CPT | Mod: GO | Performed by: OCCUPATIONAL THERAPIST

## 2025-02-21 ASSESSMENT — PAIN DESCRIPTION - DESCRIPTORS: DESCRIPTORS: TIGHTNESS

## 2025-02-21 ASSESSMENT — PAIN SCALES - GENERAL: PAINLEVEL_OUTOF10: 4

## 2025-02-21 ASSESSMENT — PAIN - FUNCTIONAL ASSESSMENT: PAIN_FUNCTIONAL_ASSESSMENT: 0-10
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
## 2025-03-07 ENCOUNTER — TREATMENT (OUTPATIENT)
Dept: OCCUPATIONAL THERAPY | Facility: CLINIC | Age: 65
End: 2025-03-07
Payer: MEDICARE

## 2025-03-07 DIAGNOSIS — M25.641 STIFFNESS OF RIGHT HAND, NOT ELSEWHERE CLASSIFIED: ICD-10-CM

## 2025-03-07 PROCEDURE — 97140 MANUAL THERAPY 1/> REGIONS: CPT | Mod: GO | Performed by: OCCUPATIONAL THERAPIST

## 2025-03-07 PROCEDURE — 97530 THERAPEUTIC ACTIVITIES: CPT | Mod: GO | Performed by: OCCUPATIONAL THERAPIST

## 2025-03-07 PROCEDURE — 97110 THERAPEUTIC EXERCISES: CPT | Mod: GO | Performed by: OCCUPATIONAL THERAPIST

## 2025-03-07 ASSESSMENT — PAIN SCALES - GENERAL: PAINLEVEL_OUTOF10: 3

## 2025-03-07 ASSESSMENT — PAIN - FUNCTIONAL ASSESSMENT: PAIN_FUNCTIONAL_ASSESSMENT: 0-10

## 2025-03-07 ASSESSMENT — PAIN DESCRIPTION - DESCRIPTORS: DESCRIPTORS: TIGHTNESS
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
## 2025-03-13 ENCOUNTER — TREATMENT (OUTPATIENT)
Dept: OCCUPATIONAL THERAPY | Facility: CLINIC | Age: 65
End: 2025-03-13
Payer: MEDICARE

## 2025-03-13 DIAGNOSIS — M25.641 STIFFNESS OF RIGHT HAND, NOT ELSEWHERE CLASSIFIED: ICD-10-CM

## 2025-03-13 PROCEDURE — 97530 THERAPEUTIC ACTIVITIES: CPT | Mod: GO | Performed by: OCCUPATIONAL THERAPIST

## 2025-03-13 PROCEDURE — 97110 THERAPEUTIC EXERCISES: CPT | Mod: GO | Performed by: OCCUPATIONAL THERAPIST

## 2025-03-13 PROCEDURE — 97140 MANUAL THERAPY 1/> REGIONS: CPT | Mod: GO | Performed by: OCCUPATIONAL THERAPIST

## 2025-03-13 ASSESSMENT — PAIN SCALES - GENERAL: PAINLEVEL_OUTOF10: 2

## 2025-03-13 ASSESSMENT — PAIN DESCRIPTION - DESCRIPTORS: DESCRIPTORS: TIGHTNESS

## 2025-03-13 ASSESSMENT — PAIN - FUNCTIONAL ASSESSMENT: PAIN_FUNCTIONAL_ASSESSMENT: 0-10
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
## 2025-03-20 ENCOUNTER — TREATMENT (OUTPATIENT)
Dept: OCCUPATIONAL THERAPY | Facility: CLINIC | Age: 65
End: 2025-03-20
Payer: MEDICARE

## 2025-03-20 DIAGNOSIS — M25.641 STIFFNESS OF RIGHT HAND, NOT ELSEWHERE CLASSIFIED: Primary | ICD-10-CM

## 2025-03-20 PROCEDURE — 97110 THERAPEUTIC EXERCISES: CPT | Mod: GO | Performed by: OCCUPATIONAL THERAPIST

## 2025-03-20 PROCEDURE — 97140 MANUAL THERAPY 1/> REGIONS: CPT | Mod: GO | Performed by: OCCUPATIONAL THERAPIST

## 2025-03-20 PROCEDURE — 97530 THERAPEUTIC ACTIVITIES: CPT | Mod: GO | Performed by: OCCUPATIONAL THERAPIST

## 2025-03-20 ASSESSMENT — PAIN SCALES - GENERAL: PAINLEVEL_OUTOF10: 6

## 2025-03-20 ASSESSMENT — PAIN DESCRIPTION - DESCRIPTORS: DESCRIPTORS: TIGHTNESS

## 2025-03-20 ASSESSMENT — PAIN - FUNCTIONAL ASSESSMENT: PAIN_FUNCTIONAL_ASSESSMENT: 0-10
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
## 2025-04-08 DIAGNOSIS — K21.9 GASTROESOPHAGEAL REFLUX DISEASE, UNSPECIFIED WHETHER ESOPHAGITIS PRESENT: ICD-10-CM

## 2025-04-08 RX ORDER — PANTOPRAZOLE SODIUM 40 MG/1
TABLET, DELAYED RELEASE ORAL
Qty: 90 TABLET | Refills: 0 | Status: SHIPPED | OUTPATIENT
Start: 2025-04-08

## 2025-04-11 ENCOUNTER — APPOINTMENT (OUTPATIENT)
Dept: OCCUPATIONAL THERAPY | Facility: CLINIC | Age: 65
End: 2025-04-11
Payer: MEDICARE

## 2025-04-15 ENCOUNTER — APPOINTMENT (OUTPATIENT)
Dept: OCCUPATIONAL THERAPY | Facility: CLINIC | Age: 65
End: 2025-04-15
Payer: MEDICARE

## 2025-04-18 ENCOUNTER — APPOINTMENT (OUTPATIENT)
Dept: OCCUPATIONAL THERAPY | Facility: CLINIC | Age: 65
End: 2025-04-18
Payer: MEDICARE

## 2025-07-23 ENCOUNTER — OFFICE VISIT (OUTPATIENT)
Dept: PRIMARY CARE | Facility: CLINIC | Age: 65
End: 2025-07-23
Payer: MEDICARE

## 2025-07-23 DIAGNOSIS — E55.9 VITAMIN D DEFICIENCY: ICD-10-CM

## 2025-07-23 DIAGNOSIS — F03.A0 MILD DEMENTIA WITHOUT BEHAVIORAL DISTURBANCE, PSYCHOTIC DISTURBANCE, MOOD DISTURBANCE, OR ANXIETY, UNSPECIFIED DEMENTIA TYPE: ICD-10-CM

## 2025-07-23 DIAGNOSIS — M81.0 AGE RELATED OSTEOPOROSIS, UNSPECIFIED PATHOLOGICAL FRACTURE PRESENCE: ICD-10-CM

## 2025-07-23 DIAGNOSIS — R11.0 NAUSEA: ICD-10-CM

## 2025-07-23 DIAGNOSIS — Z12.31 BREAST CANCER SCREENING BY MAMMOGRAM: ICD-10-CM

## 2025-07-23 DIAGNOSIS — F41.9 ANXIETY: ICD-10-CM

## 2025-07-23 DIAGNOSIS — R73.9 HYPERGLYCEMIA: ICD-10-CM

## 2025-07-23 DIAGNOSIS — R53.83 OTHER FATIGUE: ICD-10-CM

## 2025-07-23 DIAGNOSIS — Z00.00 ANNUAL PHYSICAL EXAM: Primary | ICD-10-CM

## 2025-07-23 LAB — POC HEMOGLOBIN A1C: 5.8 % (ref 4.2–6.5)

## 2025-07-23 PROCEDURE — 83036 HEMOGLOBIN GLYCOSYLATED A1C: CPT | Mod: QW | Performed by: INTERNAL MEDICINE

## 2025-07-23 PROCEDURE — 99397 PER PM REEVAL EST PAT 65+ YR: CPT | Mod: 25 | Performed by: INTERNAL MEDICINE

## 2025-07-23 PROCEDURE — 1126F AMNT PAIN NOTED NONE PRSNT: CPT | Performed by: INTERNAL MEDICINE

## 2025-07-23 PROCEDURE — 1159F MED LIST DOCD IN RCRD: CPT | Performed by: INTERNAL MEDICINE

## 2025-07-23 PROCEDURE — 99215 OFFICE O/P EST HI 40 MIN: CPT | Performed by: INTERNAL MEDICINE

## 2025-07-23 PROCEDURE — 99397 PER PM REEVAL EST PAT 65+ YR: CPT | Performed by: INTERNAL MEDICINE

## 2025-07-23 PROCEDURE — G0439 PPPS, SUBSEQ VISIT: HCPCS | Performed by: INTERNAL MEDICINE

## 2025-07-23 RX ORDER — ONDANSETRON 4 MG/1
4 TABLET, FILM COATED ORAL EVERY 8 HOURS PRN
Qty: 20 TABLET | Refills: 2 | Status: SHIPPED | OUTPATIENT
Start: 2025-07-23

## 2025-07-23 RX ORDER — FAMOTIDINE 40 MG/1
40 TABLET, FILM COATED ORAL NIGHTLY
COMMUNITY
Start: 2025-05-19

## 2025-07-23 RX ORDER — IBANDRONATE SODIUM 150 MG/1
150 TABLET, FILM COATED ORAL
Qty: 1 TABLET | Refills: 11 | Status: SHIPPED | OUTPATIENT
Start: 2025-07-23 | End: 2026-07-23

## 2025-07-23 RX ORDER — SERTRALINE HYDROCHLORIDE 25 MG/1
25 TABLET, FILM COATED ORAL DAILY
Qty: 90 TABLET | Refills: 1 | Status: SHIPPED | OUTPATIENT
Start: 2025-07-23 | End: 2026-01-19

## 2025-07-23 ASSESSMENT — PAIN SCALES - GENERAL: PAINLEVEL_OUTOF10: 0-NO PAIN

## 2025-07-23 ASSESSMENT — PATIENT HEALTH QUESTIONNAIRE - PHQ9
2. FEELING DOWN, DEPRESSED OR HOPELESS: NOT AT ALL
SUM OF ALL RESPONSES TO PHQ9 QUESTIONS 1 AND 2: 0
1. LITTLE INTEREST OR PLEASURE IN DOING THINGS: NOT AT ALL

## 2025-07-23 NOTE — PROGRESS NOTES
Outpatient Visit Note    Chief Complaint   Patient presents with    Hospital Follow-up       HPI:  Emmy Olson is a 64 y.o. female here  for a Medicare Wellness Visit.  Pt is c/o episodes of shaking and feeling unwell. She went to the ER for a shaking episode and her evaluation was normal. She admits to her heart racing and feeling like she can't get enough air.   She has chronic abdominal discomfort and GERD. She is now c/o nausea.   She is concerned that she may have diabetes because she is always thirsty.   She also is c/o fatigue.  She has osteoporosis and has not had a Prolia injection since January. She would like to try alternative treatment.     Health Maintenance  Immunizations: Tdap and Shingrix due    Denies smoking or illicit drug use, drinks no alcoholic beverages a week. Patient reports routine vision checks and dental cleanings.    Screening colonoscopy done in 11/2018. Screening pap N/A. Screening mammogram done 5/2024. DEXA done 5/2024.     Advanced directives: X    Hearing screen: reports no difficulty with hearing     Does the patient use opioid medications:  No    How does the patient rate their health status today: fair    Cognitive Screen:  AAAx3  to person, place and time: Yes      Reviewed:   Past Medical History/Allergies:  Yes  Family History:  Yes  Social History:  Yes  Current Medications:  Yes  Vital Signs:  Yes  Home Safety:                    Up & Go test > 30 seconds?  No                   Home have rugs; lack grab bars in bathroom; lack handrail on stairs; have poor lighting?  No                   Hearing difficulties?  No  Geriatric Assessment                   ADL areas requiring assistance:  Does not need help with Dressing, Eating, Ambulating, Toileting, Grooming, Hygiene.                    IADL areas requiring assistance:  Does not need help with Shopping, Housework, Accounting, Transportation, Driving.   Medications: reviewed  Current supplements:  Reviewed and  recorded.   Other providers: Reviewed and recorded    Patient Health Questionnaire-2 Score: 0 (7/23/2025  9:40 AM)          Past Medical History:   Diagnosis Date    Personal history of other specified conditions     History of heartburn        Current Medications  Current Outpatient Medications   Medication Instructions    famotidine (PEPCID) 40 mg, Nightly    ibandronate (BONIVA) 150 mg, oral, Every 30 days, Take in morning with full glass of water on an empty stomach. No food, drink, meds, or lying down for 60 minutes after.    ondansetron (ZOFRAN) 4 mg, oral, Every 8 hours PRN    pantoprazole (ProtoNix) 40 mg EC tablet TAKE ONE TABLET BY MOUTH DAILY IN THE MORNING before a meal.    sertraline (ZOLOFT) 25 mg, oral, Daily        Allergies  No Known Allergies     Immunizations  Immunization History   Administered Date(s) Administered    Pneumococcal conjugate vaccine, 20-valent (PREVNAR 20) 05/07/2024        History reviewed. No pertinent surgical history.  No family history on file.  Social History     Tobacco Use    Smoking status: Never    Smokeless tobacco: Never   Vaping Use    Vaping status: Never Used   Substance Use Topics    Alcohol use: Never    Drug use: Never     Tobacco Use: Low Risk  (7/23/2025)    Patient History     Smoking Tobacco Use: Never     Smokeless Tobacco Use: Never     Passive Exposure: Not on file        ROS  All pertinent positive symptoms are included in the history of present illness.  All other systems have been reviewed and are negative and noncontributory to this patient's current ailments.    VITAL SIGNS  Vitals:    07/23/25 0948   BP: 128/88   Pulse: 87   Temp: 36.3 °C (97.4 °F)   SpO2: 96%     Vitals:    07/23/25 0948   Weight: 75.3 kg (166 lb)      Body mass index is 30.36 kg/m².     PHYSICAL EXAM  GENERAL APPEARANCE: well nourished, well developed, looks like stated age, in no acute distress, not ill or tired appearing, conversing well.   HEENT: no trauma, normocephalic. LACIE  and EOMI with normal external exam. TM's intact with no injection or effusion, no signs of infection. Nares patent, turbinates pink without discharge. Pharynx pink with no exudates or lesions, no enlarged tonsils.   NECK: no nodes, supple without rigidity, no neck mass was observed, no thyromegaly or thyroid nodules.   HEART: regular rate and rhythm, S1 and S2 heard with no murmurs or skipped beats, no carotid bruits.   LUNGS: clear to auscultation bilaterally with no wheezes, crackles or rales.   ABDOMEN: no organomegaly, soft, nontender, nondistended, normal bowel sounds, no guarding/rebound/rigidity.   EXTREMITIES: moving all extremities equally with no edema or deformities.   SKIN: normal skin color and pigmentation, normal skin turgor without rash, lesions, or nodules visualized.   NEUROLOGIC EXAM: CN II-XII grossly intact, normal gait, normal balance, 5/5 muscle strength, sensation grossly intact.   PSYCH: mood and affect appropriate; alert and oriented to time, place, person; no difficulty with speech or language.   LYMPH NODES: no cervical lymphadenopathy.           Assessment/Plan   Diagnoses and all orders for this visit:  Annual physical exam  Hyperglycemia  -     POCT glycosylated hemoglobin (Hb A1C) manually resulted  Nausea  -     ondansetron (Zofran) 4 mg tablet; Take 1 tablet (4 mg) by mouth every 8 hours if needed for nausea.  Other fatigue  -     Vitamin B12; Future  Anxiety  -     sertraline (Zoloft) 25 mg tablet; Take 1 tablet (25 mg) by mouth once daily.  Age related osteoporosis, unspecified pathological fracture presence  -     ibandronate (Boniva) 150 mg tablet; Take 1 tablet (150 mg) by mouth every 30 (thirty) days. Take in morning with full glass of water on an empty stomach. No food, drink, meds, or lying down for 60 minutes after.  Breast cancer screening by mammogram  -     BI mammo bilateral screening tomosynthesis; Future  Vitamin D deficiency  -     Vitamin D 25-Hydroxy,Total (for  eval of Vitamin D levels); Future  Mild dementia without behavioral disturbance, psychotic disturbance, mood disturbance, or anxiety, unspecified dementia type  Comments:  Pt is no longer taking any meds for this    This was a shared decision making visit.    Next Wellness Exam Due  In 1 year from today      Edgard Mishra MD   07/25/25   12:15 AM

## 2025-07-25 VITALS
WEIGHT: 166 LBS | BODY MASS INDEX: 30.36 KG/M2 | TEMPERATURE: 97.4 F | OXYGEN SATURATION: 96 % | SYSTOLIC BLOOD PRESSURE: 128 MMHG | DIASTOLIC BLOOD PRESSURE: 88 MMHG | HEART RATE: 87 BPM

## 2025-07-25 LAB
25(OH)D3+25(OH)D2 SERPL-MCNC: 24 NG/ML (ref 30–100)
VIT B12 SERPL-MCNC: 1002 PG/ML (ref 200–1100)

## 2025-08-13 ENCOUNTER — HOSPITAL ENCOUNTER (OUTPATIENT)
Dept: RADIOLOGY | Facility: HOSPITAL | Age: 65
Discharge: HOME | End: 2025-08-13
Payer: MEDICARE

## 2025-08-13 DIAGNOSIS — Z12.31 BREAST CANCER SCREENING BY MAMMOGRAM: ICD-10-CM

## 2025-08-13 PROCEDURE — 77067 SCR MAMMO BI INCL CAD: CPT

## 2025-08-13 PROCEDURE — 77063 BREAST TOMOSYNTHESIS BI: CPT | Performed by: RADIOLOGY

## 2025-08-13 PROCEDURE — 77067 SCR MAMMO BI INCL CAD: CPT | Performed by: RADIOLOGY

## 2025-08-19 ENCOUNTER — TELEPHONE (OUTPATIENT)
Dept: PRIMARY CARE | Facility: CLINIC | Age: 65
End: 2025-08-19
Payer: MEDICARE

## 2025-08-19 DIAGNOSIS — M54.2 NECK PAIN: Primary | ICD-10-CM

## 2025-08-19 DIAGNOSIS — M81.0 AGE RELATED OSTEOPOROSIS, UNSPECIFIED PATHOLOGICAL FRACTURE PRESENCE: ICD-10-CM

## 2025-08-19 DIAGNOSIS — K21.9 GASTROESOPHAGEAL REFLUX DISEASE, UNSPECIFIED WHETHER ESOPHAGITIS PRESENT: ICD-10-CM

## 2025-08-19 RX ORDER — FAMOTIDINE 40 MG/1
40 TABLET, FILM COATED ORAL NIGHTLY
Qty: 90 TABLET | Refills: 3 | Status: SHIPPED | OUTPATIENT
Start: 2025-08-19 | End: 2026-08-19

## 2025-08-19 RX ORDER — IBANDRONATE SODIUM 150 MG/1
150 TABLET, FILM COATED ORAL
Qty: 1 TABLET | Refills: 11 | Status: SHIPPED | OUTPATIENT
Start: 2025-08-19 | End: 2026-08-19

## 2025-08-19 RX ORDER — PANTOPRAZOLE SODIUM 40 MG/1
40 TABLET, DELAYED RELEASE ORAL
Qty: 90 TABLET | Refills: 1 | Status: SHIPPED | OUTPATIENT
Start: 2025-08-19

## 2025-10-01 ENCOUNTER — APPOINTMENT (OUTPATIENT)
Dept: INTEGRATIVE MEDICINE | Facility: CLINIC | Age: 65
End: 2025-10-01
Payer: MEDICARE